# Patient Record
Sex: FEMALE | Race: WHITE | NOT HISPANIC OR LATINO | Employment: FULL TIME | URBAN - METROPOLITAN AREA
[De-identification: names, ages, dates, MRNs, and addresses within clinical notes are randomized per-mention and may not be internally consistent; named-entity substitution may affect disease eponyms.]

---

## 2017-07-24 ENCOUNTER — OFFICE VISIT (OUTPATIENT)
Dept: URGENT CARE | Facility: URGENT CARE | Age: 42
End: 2017-07-24
Payer: COMMERCIAL

## 2017-07-24 VITALS
OXYGEN SATURATION: 95 % | HEART RATE: 103 BPM | SYSTOLIC BLOOD PRESSURE: 108 MMHG | TEMPERATURE: 99.6 F | DIASTOLIC BLOOD PRESSURE: 66 MMHG | WEIGHT: 216.1 LBS

## 2017-07-24 DIAGNOSIS — K60.2 ANAL FISSURE: ICD-10-CM

## 2017-07-24 DIAGNOSIS — K64.8 INTERNAL HEMORRHOID, BLEEDING: Primary | ICD-10-CM

## 2017-07-24 LAB
ERYTHROCYTE [DISTWIDTH] IN BLOOD BY AUTOMATED COUNT: 12.7 % (ref 10–15)
HCT VFR BLD AUTO: 42.3 % (ref 35–47)
HGB BLD-MCNC: 14.1 G/DL (ref 11.7–15.7)
MCH RBC QN AUTO: 32.6 PG (ref 26.5–33)
MCHC RBC AUTO-ENTMCNC: 33.3 G/DL (ref 31.5–36.5)
MCV RBC AUTO: 98 FL (ref 78–100)
PLATELET # BLD AUTO: 236 10E9/L (ref 150–450)
RBC # BLD AUTO: 4.32 10E12/L (ref 3.8–5.2)
WBC # BLD AUTO: 9.9 10E9/L (ref 4–11)

## 2017-07-24 PROCEDURE — 99203 OFFICE O/P NEW LOW 30 MIN: CPT | Performed by: FAMILY MEDICINE

## 2017-07-24 PROCEDURE — 85027 COMPLETE CBC AUTOMATED: CPT | Performed by: FAMILY MEDICINE

## 2017-07-24 PROCEDURE — 36415 COLL VENOUS BLD VENIPUNCTURE: CPT | Performed by: FAMILY MEDICINE

## 2017-07-24 RX ORDER — CETIRIZINE HCL 5 MG
2.5 TABLET ORAL DAILY
COMMUNITY

## 2017-07-24 NOTE — MR AVS SNAPSHOT
"              After Visit Summary   2017    Carri Estrada    MRN: [id number]           Patient Information     Date Of Birth          1975        Visit Information        Provider Department      2017 12:15 PM Nancy Akhtar MD [facility]        Today's Diagnoses     Internal hemorrhoid, bleeding    -  1    Anal fissure           Follow-ups after your visit        Who to contact     If you have questions or need follow up information about today's clinic visit or your schedule please contact [facility] directly at [phone].  Normal or non-critical lab and imaging results will be communicated to you by [employer], letter or phone within 4 business days after the clinic has received the results. If you do not hear from us within 7 days, please contact the clinic through [employer] or phone. If you have a critical or abnormal lab result, we will notify you by phone as soon as possible.  Submit refill requests through [employer] or call your pharmacy and they will forward the refill request to us. Please allow 3 business days for your refill to be completed.          Additional Information About Your Visit        MyChart Information     [employer] lets you send messages to your doctor, view your test results, renew your prescriptions, schedule appointments and more. To sign up, go to [email or web address] . Click on \"Log in\" on the left side of the screen, which will take you to the Welcome page. Then click on \"Sign up Now\" on the right side of the page.     You will be asked to enter the access code listed below, as well as some personal information. Please follow the directions to create your username and password.     Your access code is: [email or web address]  Expires: 10/22/2017  1:41 PM     Your access code will  in 90 days. If you need help or a new code, please call your [place] clinic or [phone].        Care EveryWhere ID     This is your Care EveryWhere ID. " This could be used by other organizations to access your [place] medical records  [id number]        Your Vitals Were     Pulse Temperature Pulse Oximetry             103 99.6  F (37.6  C) (Oral) 95%          Blood Pressure from Last 3 Encounters:   07/24/17 108/66    Weight from Last 3 Encounters:   07/24/17 216 lb 1.6 oz (98 kg)              We Performed the Following     CBC with platelets        Primary Care Provider    None Specified       No primary provider on file.        Equal Access to Services     [facility]: Hadii aad ku hadasho Soomaali, waaxda luqadaha, qaybta kaalmada adeegyada, waxay idiin hayaan adeeg eliobrendonart morrow . So [facility] [phone].    ATENCIÓN: Si habla español, tiene a robin disposición servicios gratuitos de asistencia lingüística. Llame al [phone].    We comply with applicable federal civil rights laws and [place] laws. We do not discriminate on the basis of race, color, national origin, age, disability sex, sexual orientation or gender identity.            Thank you!     Thank you for choosing [facility]  for your care. Our goal is always to provide you with excellent care. Hearing back from our patients is one way we can continue to improve our services. Please take a few minutes to complete the written survey that you may receive in the mail after your visit with us. Thank you!             Your Updated Medication List - Protect others around you: Learn how to safely use, store and throw away your medicines at www.disposemymeds.org.          This list is accurate as of: 7/24/17  1:41 PM.  Always use your most recent med list.                   Brand Name Dispense Instructions for use Diagnosis    cetirizine 2.5 mg half-tab    zyrTEC     Take 2.5 mg by mouth daily        EXCEDRIN PO      Take 600 mg by mouth daily

## 2017-07-24 NOTE — PROGRESS NOTES
SUBJECTIVE:  Chief Complaint   Patient presents with     Rectal Problem     start: 3 days ago sx: rectal bleeding, stomach bubbly, little bloating, no pain tx: none       Carri Estrada is a 42 year old female is coming today because of visible rectal bleeding.  This has been present for 3 days.  One episode 3 days ago and one episode 1 day ago with toilet water with bright red color  The patient reports the following symptoms: loose stools (had 4 diarrhea stools 3 days ago) and visible blood  .  No rectal pain , no abdominal pain, little bloating  The patient denies: constipation and no blood/ mucous mixed in stool.  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    History reviewed. No pertinent past medical history.    ALLERGIES:  Review of patient's allergies indicates not on file.      No current outpatient prescriptions on file prior to visit.  No current facility-administered medications on file prior to visit.     Social History   Substance Use Topics     Smoking status: Current Every Day Smoker     Types: Cigarettes     Smokeless tobacco: Current User     Alcohol use Yes       Family History   Problem Relation Age of Onset     CANCER Mother      Myocardial Infarction Father      Breast Cancer Paternal Grandmother      Family History Negative Brother      Family History Negative Sister          ROS:  INTEGUMENTARY/SKIN: NEGATIVE for worrisome rashes, moles or lesions  EYES: NEGATIVE for vision changes or irritation  ENT/MOUTH: NEGATIVE for ear, mouth and throat problems  RESP:NEGATIVE for significant cough or SOB  GI: NEGATIVE for nausea, abdominal pain, heartburn, or change in bowel habits    OBJECTIVE:   /66 (BP Location: Right arm, Patient Position: Chair, Cuff Size: Adult Large)  Pulse 103  Temp 99.6  F (37.6  C) (Oral)  Wt 216 lb 1.6 oz (98 kg)  SpO2 95%  EXAM:  GENERAL APPEARANCE: alert, mild distress and  cooperative  ABDOMEN: soft, nontender, without hepatosplenomegaly or masses and bowel sounds normal  RECTAL:  internal hemorroid(s) present at 1.5 cm with soft clot visible and little oozing and  Tear/ fissure of anal ring at 6 o'clock  With no bleeding in region of scar from previous repair  MS: extremities normal- no gross deformities noted and gait normal  SKIN: no suspicious lesions or rashes  NEURO: Normal strength and tone, sensory exam grossly normal, mentation intact and speech normal  Results for orders placed or performed in visit on 07/24/17   CBC with platelets   Result Value Ref Range    WBC 9.9 4.0 - 11.0 10e9/L    RBC Count 4.32 3.8 - 5.2 10e12/L    Hemoglobin 14.1 11.7 - 15.7 g/dL    Hematocrit 42.3 35.0 - 47.0 %    MCV 98 78 - 100 fl    MCH 32.6 26.5 - 33.0 pg    MCHC 33.3 31.5 - 36.5 g/dL    RDW 12.7 10.0 - 15.0 %    Platelet Count 236 150 - 450 10e9/L         ASSESSMENT:  Internal hemorrhoid, bleeding     - CBC with platelets    Anal fissure     - CBC with platelets   : Patient should add stool softeners like metamucil and/ or docusate to the daily meds to be able to pass stools without problems of damage to the anus and adjacent tissues.    Discussed using warm water rinse to clean the anus to reduce trauma to the anal area    If severe bleeding return for hemaglobin re-check

## 2017-07-24 NOTE — NURSING NOTE
Chief Complaint   Patient presents with     Rectal Problem     start: 3 days ago sx: rectal bleeding, stomach bubbly, little bloating, no pain tx: none         Initial /66 (BP Location: Right arm, Patient Position: Chair, Cuff Size: Adult Large)  Pulse 103  Temp 99.6  F (37.6  C) (Oral)  Wt 216 lb 1.6 oz (98 kg)  SpO2 95% There is no height or weight on file to calculate BMI.  Medication Reconciliation: complete   Dorcas Holt MA

## 2017-10-10 ENCOUNTER — OFFICE VISIT (OUTPATIENT)
Dept: FAMILY MEDICINE | Facility: CLINIC | Age: 42
End: 2017-10-10
Payer: COMMERCIAL

## 2017-10-10 VITALS
TEMPERATURE: 98.6 F | DIASTOLIC BLOOD PRESSURE: 88 MMHG | BODY MASS INDEX: 37.07 KG/M2 | HEIGHT: 65 IN | WEIGHT: 222.5 LBS | HEART RATE: 91 BPM | SYSTOLIC BLOOD PRESSURE: 126 MMHG | OXYGEN SATURATION: 99 %

## 2017-10-10 DIAGNOSIS — R21 RASH: Primary | ICD-10-CM

## 2017-10-10 PROCEDURE — 99213 OFFICE O/P EST LOW 20 MIN: CPT | Performed by: NURSE PRACTITIONER

## 2017-10-10 RX ORDER — TRIAMCINOLONE ACETONIDE 1 MG/G
OINTMENT TOPICAL
Qty: 30 G | Refills: 0 | Status: SHIPPED | OUTPATIENT
Start: 2017-10-10 | End: 2018-12-13

## 2017-10-10 ASSESSMENT — PAIN SCALES - GENERAL: PAINLEVEL: NO PAIN (0)

## 2017-10-10 NOTE — MR AVS SNAPSHOT
"              After Visit Summary   10/10/2017    Carri Estrada    MRN: [id number]           Patient Information     Date Of Birth          1975        Visit Information        Provider Department      10/10/2017 2:20 PM Kristine Sanchez APRN CNP [facility]        Today's Diagnoses     Rash    -  1      Care Instructions    If no improvement in the next 2 weeks please let me know.            Follow-ups after your visit        Follow-up notes from your care team     Return in about 2 weeks (around 10/24/2017), or if symptoms worsen or fail to improve.      Who to contact     If you have questions or need follow up information about today's clinic visit or your schedule please contact [facility] directly at [phone].  Normal or non-critical lab and imaging results will be communicated to you by [employer], letter or phone within 4 business days after the clinic has received the results. If you do not hear from us within 7 days, please contact the clinic through MyChart or phone. If you have a critical or abnormal lab result, we will notify you by phone as soon as possible.  Submit refill requests through [employer] or call your pharmacy and they will forward the refill request to us. Please allow 3 business days for your refill to be completed.          Additional Information About Your Visit        MyChart Information     [employer] lets you send messages to your doctor, view your test results, renew your prescriptions, schedule appointments and more. To sign up, go to [email or web address] . Click on \"Log in\" on the left side of the screen, which will take you to the Welcome page. Then click on \"Sign up Now\" on the right side of the page.     You will be asked to enter the access code listed below, as well as some personal information. Please follow the directions to create your username and password.     Your access code is: [email or web address]  Expires: 10/22/2017  1:41 PM   " "  Your access code will  in 90 days. If you need help or a new code, please call your [place] clinic or [phone].        Care EveryWhere ID     This is your Care EveryWhere ID. This could be used by other organizations to access your [place] medical records  [id number]        Your Vitals Were     Pulse Temperature Height Last Period Pulse Oximetry BMI (Body Mass Index)    91 98.6  F (37  C) (Oral) 5' 5\" (1.651 m) 2017 99% 37.03 kg/m2       Blood Pressure from Last 3 Encounters:   10/10/17 126/88   17 108/66    Weight from Last 3 Encounters:   10/10/17 222 lb 8 oz (100.9 kg)   17 216 lb 1.6 oz (98 kg)              Today, you had the following     No orders found for display         Today's Medication Changes          These changes are accurate as of: 10/10/17  3:01 PM.  If you have any questions, ask your nurse or doctor.               Start taking these medicines.        Dose/Directions    triamcinolone 0.1 % ointment   Commonly known as:  KENALOG   Used for:  Rash   Started by:  Kristine Sanchez APRN CNP        Apply sparingly to affected area three times daily for 14 days.   Quantity:  30 g   Refills:  0            Where to get your medicines      These medications were sent to [facility] [id number] [place] [address] [address] [facility] AT [facility]   [facility], [facility] [address]     Phone:  [phone]     triamcinolone 0.1 % ointment                Primary Care Provider    None Specified       No primary provider on file.        Equal Access to Services     [facility] : Hadii aad ku hadasho Solacho, waaxda luqadaha, qaybta kaalmada shalini johnson. So [facility] [phone].    ATENCIÓN: Si habla español, tiene a robin disposición servicios gratuitos de asistencia lingüística. Llame al [phone].    We comply with applicable federal civil rights laws and [place] laws. We do not discriminate on the basis of race, color, " national origin, age, disability, sex, sexual orientation, or gender identity.            Thank you!     Thank you for choosing [facility]  for your care. Our goal is always to provide you with excellent care. Hearing back from our patients is one way we can continue to improve our services. Please take a few minutes to complete the written survey that you may receive in the mail after your visit with us. Thank you!             Your Updated Medication List - Protect others around you: Learn how to safely use, store and throw away your medicines at www.disposemymeds.org.          This list is accurate as of: 10/10/17  3:01 PM.  Always use your most recent med list.                   Brand Name Dispense Instructions for use Diagnosis    cetirizine 2.5 mg half-tab    zyrTEC     Take 2.5 mg by mouth daily        EXCEDRIN PO      Take 600 mg by mouth daily        triamcinolone 0.1 % ointment    KENALOG    30 g    Apply sparingly to affected area three times daily for 14 days.    Rash

## 2017-10-10 NOTE — NURSING NOTE
"Chief Complaint   Patient presents with     Derm Problem     Initial /88 (BP Location: Right arm, Patient Position: Chair, Cuff Size: Adult Large)  Pulse 91  Temp 98.6  F (37  C) (Oral)  Ht 5' 5\" (1.651 m)  Wt 222 lb 8 oz (100.9 kg)  LMP 09/24/2017  SpO2 99%  BMI 37.03 kg/m2 Estimated body mass index is 37.03 kg/(m^2) as calculated from the following:    Height as of this encounter: 5' 5\" (1.651 m).    Weight as of this encounter: 222 lb 8 oz (100.9 kg).  BP completed using cuff size large right arm.    Lisa Magill, CMA    "

## 2017-10-10 NOTE — PROGRESS NOTES
HPI    SUBJECTIVE:   Carri Estrada is a 42 year old female who presents to clinic today for the following health issues:      Rash      Duration: JULY    Description  Location: all over randomly, but really behind legs  Itching: severe    Intensity:  severe    Accompanying signs and symptoms: None    History (similar episodes/previous evaluation): None    Precipitating or alleviating factors:  New exposures:  None  Recent travel: no      Therapies tried and outcome: Zyrtec/cetirizine -  effective     has a rash too.  His started after hers did.  He was seen in the clinic and diagnosed with dermatitis.  He has been using a steroid cream without improvement.  His rash is over his lower midsection and she reports his rash looks much different than hers.  Her rash started with small bumps behind both knees.  She has been getting itchy bumps that pop up together in various places over the past couple months.  They are very, very itchy when they develop.  Notes were living in a basement for awhile.  Mattress was quite old.  Recently have moved and purchased a new mattress.      Problem list and histories reviewed & adjusted, as indicated.  Additional history: as documented    Current Outpatient Prescriptions   Medication Sig Dispense Refill     cetirizine (ZYRTEC) 2.5 mg half-tab Take 2.5 mg by mouth daily       Aspirin-Acetaminophen-Caffeine (EXCEDRIN PO) Take 600 mg by mouth daily       Allergies   Allergen Reactions     Foeniculum Vulgare Swelling     No Clinical Screening - See Comments      Other reaction(s): Edema       Reviewed and updated as needed this visit by clinical staffTobacco  Allergies  Meds  Problems  Med Hx  Surg Hx  Fam Hx  Soc Hx        Reviewed and updated as needed this visit by Provider         ROS:  Constitutional, HEENT, cardiovascular, pulmonary, gi and gu systems are negative, except as otherwise noted.      OBJECTIVE:   /88 (BP Location: Right arm, Patient Position:  "Chair, Cuff Size: Adult Large)  Pulse 91  Temp 98.6  F (37  C) (Oral)  Ht 5' 5\" (1.651 m)  Wt 222 lb 8 oz (100.9 kg)  LMP 09/24/2017  SpO2 99%  BMI 37.03 kg/m2  Body mass index is 37.03 kg/(m^2).  GENERAL: healthy, alert and no distress  SKIN: Very few scatterred small, ~1mm pink papules some with excoriated centers on arms, trunk and legs    Diagnostic Test Results:  none     ASSESSMENT/PLAN:   1. Rash  ?etiology, discussed possible bed bugs given intense itching.  No borrows to indicate scabies.  Check mattress for signs of bed bugs; treat as necessary.  May need derm referral if no sign of bed bugs and no improvement in the next 2 weeks.   - triamcinolone (KENALOG) 0.1 % ointment; Apply sparingly to affected area three times daily for 14 days.  Dispense: 30 g; Refill: 0    F/u 2 wks; prn     WARD Combs CNP  [facility]      Physical Exam      "

## 2018-02-28 ENCOUNTER — TELEPHONE (OUTPATIENT)
Dept: FAMILY MEDICINE | Facility: CLINIC | Age: 43
End: 2018-02-28

## 2018-02-28 NOTE — TELEPHONE ENCOUNTER
Panel Management Review      Patient has the following on her problem list: None      Composite cancer screening  Chart review shows that this patient is due/due soon for the following Pap Smear  Summary:    Patient is due/failing the following:   PAP    Action needed:   Patient needs office visit for physical with pap.    Type of outreach:    Phone, left message for patient to call back.     Questions for provider review:    None                                                                                                                                    Samaria Ibrahim MA     Chart routed to Care Team .

## 2018-03-23 ENCOUNTER — RADIANT APPOINTMENT (OUTPATIENT)
Dept: GENERAL RADIOLOGY | Facility: CLINIC | Age: 43
End: 2018-03-23
Attending: NURSE PRACTITIONER
Payer: COMMERCIAL

## 2018-03-23 ENCOUNTER — OFFICE VISIT (OUTPATIENT)
Dept: PEDIATRICS | Facility: CLINIC | Age: 43
End: 2018-03-23
Payer: COMMERCIAL

## 2018-03-23 VITALS
HEIGHT: 65 IN | HEART RATE: 95 BPM | WEIGHT: 225.3 LBS | SYSTOLIC BLOOD PRESSURE: 118 MMHG | OXYGEN SATURATION: 96 % | BODY MASS INDEX: 37.54 KG/M2 | TEMPERATURE: 98.6 F | DIASTOLIC BLOOD PRESSURE: 76 MMHG

## 2018-03-23 DIAGNOSIS — Z71.6 ENCOUNTER FOR SMOKING CESSATION COUNSELING: ICD-10-CM

## 2018-03-23 DIAGNOSIS — Z00.00 HEALTH CARE MAINTENANCE: Primary | ICD-10-CM

## 2018-03-23 DIAGNOSIS — R05.9 COUGH: ICD-10-CM

## 2018-03-23 DIAGNOSIS — Z12.4 SCREENING FOR MALIGNANT NEOPLASM OF CERVIX: ICD-10-CM

## 2018-03-23 PROCEDURE — 87624 HPV HI-RISK TYP POOLED RSLT: CPT | Performed by: NURSE PRACTITIONER

## 2018-03-23 PROCEDURE — 99396 PREV VISIT EST AGE 40-64: CPT | Performed by: NURSE PRACTITIONER

## 2018-03-23 PROCEDURE — 71046 X-RAY EXAM CHEST 2 VIEWS: CPT

## 2018-03-23 PROCEDURE — G0145 SCR C/V CYTO,THINLAYER,RESCR: HCPCS | Performed by: NURSE PRACTITIONER

## 2018-03-23 ASSESSMENT — ENCOUNTER SYMPTOMS
ARTHRALGIAS: 0
DIZZINESS: 1
CONSTIPATION: 0
PARESTHESIAS: 0
SORE THROAT: 0
SHORTNESS OF BREATH: 1
PALPITATIONS: 0
EYE PAIN: 0
WEAKNESS: 1
MYALGIAS: 1
HEADACHES: 1
CHILLS: 0
DIARRHEA: 0
NAUSEA: 1
COUGH: 1
HEMATURIA: 0
HEARTBURN: 1
HEMATOCHEZIA: 0
JOINT SWELLING: 0
DYSURIA: 0
ABDOMINAL PAIN: 0
FREQUENCY: 0

## 2018-03-23 NOTE — PROGRESS NOTES
SUBJECTIVE:   CC: Carri Estrada is an 42 year old woman who presents for preventive health visit.     Physical   Annual:     Getting at least 3 servings of Calcium per day::  Yes    Bi-annual eye exam::  NO    Dental care twice a year::  Yes    Sleep apnea or symptoms of sleep apnea::  Daytime drowsiness    Diet::  Regular (no restrictions)    Frequency of exercise::  None    Taking medications regularly::  Yes    Medication side effects::  None    Additional concerns today::  No                Discuss Tobacco cessation- chantix    Today's PHQ-2 Score:   PHQ-2 ( 1999 Pfizer) 10/10/2017   Q1: Little interest or pleasure in doing things 0   Q2: Feeling down, depressed or hopeless 0   PHQ-2 Score 0       Abuse: Current or Past(Physical, Sexual or Emotional)- No  Do you feel safe in your environment - Yes    Social History   Substance Use Topics     Smoking status: Current Every Day Smoker     Packs/day: 0.50     Types: Cigarettes     Smokeless tobacco: Never Used     Alcohol use Yes      Comment: couple drinks a day in the evening      No flowsheet data found.  AUDIT - Alcohol Use Disorders Identification Test - Reproduced from the World Health Organization Audit 2001 (Second Edition) 3/23/2018   1.  How often do you have a drink containing alcohol? 4 or more times a week   2.  How many drinks containing alcohol do you have on a typical day when you are drinking? 3 or 4   3.  How often do you have five or more drinks on one occasion? Daily or almost daily   4.  How often during the last year have you found that you were not able to stop drinking once you had started? Daily or almost daily   5.  How often during the last year have you failed to do what was normally expected of you because of drinking? Less than monthly   6.  How often during the last year have you needed a first drink in the morning to get yourself going after a heavy drinking session? Never   7.  How often during the last year have you had a  feeling of guilt or remorse after drinking? Less than monthly   8.  How often during the last year have you been unable to remember what happened the night before because of your drinking? Never   9.  Have you or someone else been injured because of your drinking? No   10. Has a relative, friend, doctor or other health care worker been concerned about your drinking or suggested you cut down? No   TOTAL SCORE 15   Drinks almost nightly to cope with stress.       Smoking since 6th grade. Wheezes at night.        Reviewed orders with patient.  Reviewed health maintenance and updated orders accordingly - Yes  Labs reviewed in EPIC    Patient under age 50, mutual decision reflected in health maintenance.      Pertinent mammograms are reviewed under the imaging tab.  History of abnormal Pap smear: 5 years ago - pap results were normal at [facility] by Dr. Angelina Pepper  and is looking to see provider there again and do PAP    Reviewed and updated as needed this visit by clinical staff         Reviewed and updated as needed this visit by Provider        Patient states she listed many + ROS but doesn't think we need to talk about any of them as they are intermittent. Feels week because she is overweight. Gets intermittent nausea, headaches, muscle aches, heartburn, etc. No chest pain, shortness of breath, or progressive sx.     Review of Systems   Constitutional: Negative for chills.   HENT: Positive for congestion. Negative for ear pain, hearing loss and sore throat.    Eyes: Positive for visual disturbance. Negative for pain.   Respiratory: Positive for cough and shortness of breath.    Cardiovascular: Negative for chest pain, palpitations and peripheral edema.   Gastrointestinal: Positive for heartburn and nausea. Negative for abdominal pain, constipation, diarrhea and hematochezia.   Genitourinary: Negative for dysuria, frequency, genital sores, hematuria, pelvic pain, urgency, vaginal  "bleeding and vaginal discharge.   Musculoskeletal: Positive for myalgias. Negative for arthralgias and joint swelling.   Skin: Negative for rash.   Neurological: Positive for dizziness, weakness and headaches. Negative for paresthesias.   Psychiatric/Behavioral: Positive for mood changes.          OBJECTIVE:   /76  Pulse 95  Temp 98.6  F (37  C) (Oral)  Ht 5' 5\" (1.651 m)  Wt 225 lb 4.8 oz (102.2 kg)  SpO2 96%  BMI 37.49 kg/m2  Physical Exam  GENERAL: healthy, alert and no distress  EYES: Eyes grossly normal to inspection, PERRL and conjunctivae and sclerae normal  HENT: ear canals and TM's normal, nose and mouth without ulcers or lesions  NECK: no adenopathy, no asymmetry, masses, or scars and thyroid normal to palpation  RESP: lungs clear to auscultation - no rales, rhonchi or wheezes  BREAST: normal without masses, tenderness or nipple discharge and no palpable axillary masses or adenopathy  CV: regular rate and rhythm, normal S1 S2, no S3 or S4, no murmur, click or rub, no peripheral edema and peripheral pulses strong  ABDOMEN: soft, nontender, no hepatosplenomegaly, no masses and bowel sounds normal  MS: no gross musculoskeletal defects noted, no edema  SKIN: no suspicious lesions or rashes  NEURO: Normal strength and tone, mentation intact and speech normal  PSYCH: mentation appears normal, affect normal/bright    ASSESSMENT/PLAN:   1. Health care maintenance  - TSH with free T4 reflex  - Lipid panel reflex to direct LDL Fasting  - Glucose  - MA Screening Digital Bilateral; Future    2. Screening for malignant neoplasm of cervix  Pap today.     3. Encounter for smoking cessation counseling  Has tolerated chanitx in the past. Discussed r/b/se and reasons to seek care urgently.   - varenicline (CHANTIX STARTING MONTH HUONG) 0.5 MG X 11 & 1 MG X 42 tablet; Take 0.5 mg tab daily for 3 days, then 0.5 mg tab twice daily for 4 days, then 1 mg twice daily.  Dispense: 53 tablet; Refill: 0    4. Cough  Ongoing " "intermittent cough. No fever, weight loss, hemoptysis. Wants CXR to rule out lung CA.  -If normal, will monitor sx and RTC for worsening sx.  -Start Chantix.   -Declines PFTs  - XR Chest 2 Views; Future    COUNSELING:  Reviewed preventive health counseling, as reflected in patient instructions         reports that she has been smoking Cigarettes.  She has been smoking about 0.50 packs per day. She has never used smokeless tobacco.  Tobacco Cessation Action Plan: SEe above  Estimated body mass index is 37.03 kg/(m^2) as calculated from the following:    Height as of 10/10/17: 5' 5\" (1.651 m).    Weight as of 10/10/17: 222 lb 8 oz (100.9 kg).   Weight management plan: Discussed healthy diet and exercise guidelines and patient will follow up in 12 months in clinic to re-evaluate.    Counseling Resources:  ATP IV Guidelines  Pooled Cohorts Equation Calculator  Breast Cancer Risk Calculator  FRAX Risk Assessment  ICSI Preventive Guidelines  Dietary Guidelines for Americans, 2010  USDA's MyPlate  ASA Prophylaxis  Lung CA Screening    WARD Pardo [facility] JACINTA  Answers for HPI/ROS submitted by the patient on 3/23/2018   PHQ-2 Score: 2    "

## 2018-03-23 NOTE — MR AVS SNAPSHOT
After Visit Summary   3/23/2018    Carri Estrada    MRN: [id number]           Patient Information     Date Of Birth          1975        Visit Information        Provider Department      3/23/2018 10:20 AM Selena Muller APRN [facility]        Today's Diagnoses     Health care maintenance    -  1    Screening for malignant neoplasm of cervix        Encounter for smoking cessation counseling        Cough          Care Instructions    -We did pap today!  -Get labs  -I recommend new weight watchers program  -Limit alcohol to no more than 7 per week  -Chantix  -I'll get back to you about chest xray results.         Preventive Health Recommendations  Female Ages 40 to 49    Yearly exam:     See your health care provider every year in order to  1. Review health changes.   2. Discuss preventive care.    3. Review your medicines if your doctor prescribed any.      Get a Pap test every three years (unless you have an abnormal result and your provider advises testing more often).      If you get Pap tests with HPV test, you only need to test every 5 years, unless you have an abnormal result. You do not need a Pap test if your uterus was removed (hysterectomy) and you have not had cancer.      You should be tested each year for STDs (sexually transmitted diseases), if you're at risk.       Ask your doctor if you should have a mammogram.      Have a colonoscopy (test for colon cancer) if someone in your family has had colon cancer or polyps before age 50.       Have a cholesterol test every 5 years.       Have a diabetes test (fasting glucose) after age 45. If you are at risk for diabetes, you should have this test every 3 years.    Shots: Get a flu shot each year. Get a tetanus shot every 10 years.     Nutrition:     Eat at least 5 servings of fruits and vegetables each day.    Eat whole-grain bread, whole-wheat pasta and brown rice instead of white grains and rice.    Talk to  "your provider about Calcium and Vitamin D.     Lifestyle    Exercise at least 150 minutes a week (an average of 30 minutes a day, 5 days a week). This will help you control your weight and prevent disease.    Limit alcohol to one drink per day.    No smoking.     Wear sunscreen to prevent skin cancer.    See your dentist every six months for an exam and cleaning.          Follow-ups after your visit        Future tests that were ordered for you today     Open Future Orders        Priority Expected Expires Ordered    XR Chest 2 Views Routine 3/23/2018 3/23/2019 3/23/2018    XR Chest 2 Views Routine 3/23/2018 3/23/2019 3/23/2018    MA Screening Digital Bilateral Routine  3/23/2019 3/23/2018            Who to contact     If you have questions or need follow up information about today's clinic visit or your schedule please contact [facility] directly at [phone].  Normal or non-critical lab and imaging results will be communicated to you by MyChart, letter or phone within 4 business days after the clinic has received the results. If you do not hear from us within 7 days, please contact the clinic through [employer] or phone. If you have a critical or abnormal lab result, we will notify you by phone as soon as possible.  Submit refill requests through [employer] or call your pharmacy and they will forward the refill request to us. Please allow 3 business days for your refill to be completed.          Additional Information About Your Visit        [employer] Information     [employer] lets you send messages to your doctor, view your test results, renew your prescriptions, schedule appointments and more. To sign up, go to [email or web address] . Click on \"Log in\" on the left side of the screen, which will take you to the Welcome page. Then click on \"Sign up Now\" on the right side of the page.     You will be asked to enter the access code listed below, as well as some personal information. Please follow the " "directions to create your username and password.     Your access code is: [email or web address]  Expires: 2018 11:20 AM     Your access code will  in 90 days. If you need help or a new code, please call your [place] clinic or [phone].        Care EveryWhere ID     This is your Care EveryWhere ID. This could be used by other organizations to access your [place] medical records  [id number]        Your Vitals Were     Pulse Temperature Height Pulse Oximetry BMI (Body Mass Index)       95 98.6  F (37  C) (Oral) 5' 5\" (1.651 m) 96% 37.49 kg/m2        Blood Pressure from Last 3 Encounters:   18 118/76   10/10/17 126/88   17 108/66    Weight from Last 3 Encounters:   18 225 lb 4.8 oz (102.2 kg)   10/10/17 222 lb 8 oz (100.9 kg)   17 216 lb 1.6 oz (98 kg)              We Performed the Following     Glucose     Lipid panel reflex to direct LDL Fasting     TSH with free T4 reflex          Today's Medication Changes          These changes are accurate as of 3/23/18 11:20 AM.  If you have any questions, ask your nurse or doctor.               Start taking these medicines.        Dose/Directions    varenicline 0.5 MG X 11 & 1 MG X 42 tablet   Commonly known as:  CHANTIX STARTING MONTH HUONG   Used for:  Encounter for smoking cessation counseling   Started by:  Selena Muller APRN CNP        Take 0.5 mg tab daily for 3 days, then 0.5 mg tab twice daily for 4 days, then 1 mg twice daily.   Quantity:  53 tablet   Refills:  0            Where to get your medicines      These medications were sent to [id number] - [place], [place] - [address]  [address], [facility] [address]     Phone:  [phone]     varenicline 0.5 MG X 11 & 1 MG X 42 tablet                Primary Care Provider Fax #    Physician No Ref-Primary [phone]       No address on file        Equal Access to Services     VERITO BURGESS AH: merrick Vaughn, " martha hendricksvictor hugosamuel rodriguezlawrence leonin hayaan adeeg kharash la'aan ah. So [facility] [phone].    ATENCIÓN: Si blanka pond, tiene a robin disposición servicios gratuitos de asistencia lingüística. Daxa al [phone].    We comply with applicable federal civil rights laws and [place] laws. We do not discriminate on the basis of race, color, national origin, age, disability, sex, sexual orientation, or gender identity.            Thank you!     Thank you for choosing [facility]  for your care. Our goal is always to provide you with excellent care. Hearing back from our patients is one way we can continue to improve our services. Please take a few minutes to complete the written survey that you may receive in the mail after your visit with us. Thank you!             Your Updated Medication List - Protect others around you: Learn how to safely use, store and throw away your medicines at www.disposemymeds.org.          This list is accurate as of 3/23/18 11:20 AM.  Always use your most recent med list.                   Brand Name Dispense Instructions for use Diagnosis    cetirizine 2.5 mg half-tab    zyrTEC     Take 2.5 mg by mouth daily        EXCEDRIN PO      Take 600 mg by mouth daily        triamcinolone 0.1 % ointment    KENALOG    30 g    Apply sparingly to affected area three times daily for 14 days.    Rash       varenicline 0.5 MG X 11 & 1 MG X 42 tablet    CHANTIX STARTING MONTH HUONG    53 tablet    Take 0.5 mg tab daily for 3 days, then 0.5 mg tab twice daily for 4 days, then 1 mg twice daily.    Encounter for smoking cessation counseling

## 2018-03-23 NOTE — LETTER
April 4, 2018    Carri Estrada  [address]    [place] [address]    Dear Carri,  We are happy to inform you that your PAP smear result from 03/23/18 is normal.  We are now able to do a follow up test on PAP smears. The DNA test is for HPV (Human Papilloma Virus). Cervical cancer is closely linked with certain types of HPV. Your result showed no evidence of high risk HPV.  Therefore we recommend you return in 3 years for your next pap smear.  You will still need to return to the clinic every year for an annual exam and other preventive tests.  Please contact the clinic at [phone] with any questions.  Sincerely,    WARD Pardo CNP/es

## 2018-03-27 LAB
COPATH REPORT: NORMAL
PAP: NORMAL

## 2018-03-28 DIAGNOSIS — Z00.00 HEALTH CARE MAINTENANCE: ICD-10-CM

## 2018-03-28 LAB
CHOLEST SERPL-MCNC: 190 MG/DL
GLUCOSE SERPL-MCNC: 93 MG/DL (ref 70–99)
HDLC SERPL-MCNC: 48 MG/DL
LDLC SERPL CALC-MCNC: 118 MG/DL
NONHDLC SERPL-MCNC: 142 MG/DL
TRIGL SERPL-MCNC: 119 MG/DL
TSH SERPL DL<=0.005 MIU/L-ACNC: 1.67 MU/L (ref 0.4–4)

## 2018-03-28 PROCEDURE — 80061 LIPID PANEL: CPT | Performed by: NURSE PRACTITIONER

## 2018-03-28 PROCEDURE — 82947 ASSAY GLUCOSE BLOOD QUANT: CPT | Performed by: NURSE PRACTITIONER

## 2018-03-28 PROCEDURE — 84443 ASSAY THYROID STIM HORMONE: CPT | Performed by: NURSE PRACTITIONER

## 2018-03-28 PROCEDURE — 36415 COLL VENOUS BLD VENIPUNCTURE: CPT | Performed by: NURSE PRACTITIONER

## 2018-03-29 LAB
FINAL DIAGNOSIS: NORMAL
HPV HR 12 DNA CVX QL NAA+PROBE: NEGATIVE
HPV16 DNA SPEC QL NAA+PROBE: NEGATIVE
HPV18 DNA SPEC QL NAA+PROBE: NEGATIVE
SPECIMEN DESCRIPTION: NORMAL
SPECIMEN SOURCE CVX/VAG CYTO: NORMAL

## 2018-04-10 ENCOUNTER — RADIANT APPOINTMENT (OUTPATIENT)
Dept: MAMMOGRAPHY | Facility: CLINIC | Age: 43
End: 2018-04-10
Attending: NURSE PRACTITIONER
Payer: COMMERCIAL

## 2018-04-10 DIAGNOSIS — Z00.00 HEALTH CARE MAINTENANCE: ICD-10-CM

## 2018-04-10 DIAGNOSIS — Z12.31 VISIT FOR SCREENING MAMMOGRAM: ICD-10-CM

## 2018-04-10 PROCEDURE — 77067 SCR MAMMO BI INCL CAD: CPT | Mod: TC

## 2018-04-10 PROCEDURE — 77063 BREAST TOMOSYNTHESIS BI: CPT | Mod: TC

## 2018-04-16 ENCOUNTER — HOSPITAL ENCOUNTER (OUTPATIENT)
Dept: ULTRASOUND IMAGING | Facility: CLINIC | Age: 43
End: 2018-04-16
Attending: NURSE PRACTITIONER
Payer: COMMERCIAL

## 2018-04-16 DIAGNOSIS — R92.8 ABNORMAL MAMMOGRAM: ICD-10-CM

## 2018-04-16 PROCEDURE — 76642 ULTRASOUND BREAST LIMITED: CPT | Mod: LT

## 2018-04-30 ENCOUNTER — HOSPITAL ENCOUNTER (OUTPATIENT)
Dept: MRI IMAGING | Facility: CLINIC | Age: 43
Discharge: HOME | End: 2018-04-30
Attending: NURSE PRACTITIONER | Admitting: NURSE PRACTITIONER
Payer: COMMERCIAL

## 2018-04-30 DIAGNOSIS — R92.8 ABNORMAL MAMMOGRAM: ICD-10-CM

## 2018-04-30 PROCEDURE — 25000128 H RX IP 250 OP 636: Performed by: NURSE PRACTITIONER

## 2018-04-30 PROCEDURE — 77059 MR BREAST BILATERAL W/O & W CONTRAST: CPT

## 2018-04-30 PROCEDURE — A9585 GADOBUTROL INJECTION: HCPCS | Performed by: NURSE PRACTITIONER

## 2018-04-30 RX ORDER — GADOBUTROL 604.72 MG/ML
10 INJECTION INTRAVENOUS ONCE
Status: COMPLETED | OUTPATIENT
Start: 2018-04-30 | End: 2018-04-30

## 2018-04-30 RX ADMIN — GADOBUTROL 10 ML: 604.72 INJECTION INTRAVENOUS at 08:53

## 2018-05-02 ENCOUNTER — TELEPHONE (OUTPATIENT)
Dept: MAMMOGRAPHY | Facility: CLINIC | Age: 43
End: 2018-05-02

## 2018-05-02 NOTE — TELEPHONE ENCOUNTER
"Patient notified of abnormal breast MRI results.  Explain that an area in each breast \"lights up\" and that bilateral ultrasounds are recommended to further evaluate.  Instructed patient to schedule these exams.  She will receive call from  once orders are placed. Selena Muller notified and will place orders. Patient voices frustration with multiple tests.  Offer reassurance and re-enforce how thorough the radiologist exams are and that ultrasound is very specific way of looking at breast tissue.  Encouraged her to call as more questions arise, provided RN contact information.     "

## 2018-05-04 ENCOUNTER — HOSPITAL ENCOUNTER (OUTPATIENT)
Dept: ULTRASOUND IMAGING | Facility: CLINIC | Age: 43
Discharge: HOME | End: 2018-05-04
Attending: NURSE PRACTITIONER | Admitting: NURSE PRACTITIONER
Payer: COMMERCIAL

## 2018-05-04 DIAGNOSIS — R93.89 ABNORMAL MRI: ICD-10-CM

## 2018-05-04 PROCEDURE — 76642 ULTRASOUND BREAST LIMITED: CPT | Mod: 50

## 2018-05-10 DIAGNOSIS — Z71.6 ENCOUNTER FOR SMOKING CESSATION COUNSELING: Primary | ICD-10-CM

## 2018-05-10 RX ORDER — VARENICLINE TARTRATE 1 MG/1
1 TABLET, FILM COATED ORAL 2 TIMES DAILY
Qty: 56 TABLET | Refills: 2 | Status: SHIPPED | OUTPATIENT
Start: 2018-05-10 | End: 2018-10-09

## 2018-05-10 NOTE — TELEPHONE ENCOUNTER
Pharmacy calling to request a rx for Chantix continuing pack. She finished the starter pack that was rx'd on 3/23/18. Looking for refill today.     Routing to Selena to advise on Chantix continuing pack rx.

## 2018-09-17 ENCOUNTER — TELEPHONE (OUTPATIENT)
Dept: PEDIATRICS | Facility: CLINIC | Age: 43
End: 2018-09-17

## 2018-09-17 NOTE — TELEPHONE ENCOUNTER
Menstrual cycles are changing as she gets older.  Menstrual cramps are getting worse.  Ibuprofen is not working for treatment of cramps  Menses lasts 3 days with heavy bleeding on the second day.  Would like appointment to discuss cramping with periods.  Appointment scheduled with Selena Muller to discuss symptoms.  OSVALDO Daly RN

## 2018-10-09 ENCOUNTER — OFFICE VISIT (OUTPATIENT)
Dept: PEDIATRICS | Facility: CLINIC | Age: 43
End: 2018-10-09
Payer: COMMERCIAL

## 2018-10-09 VITALS
HEIGHT: 65 IN | TEMPERATURE: 97.6 F | DIASTOLIC BLOOD PRESSURE: 78 MMHG | SYSTOLIC BLOOD PRESSURE: 122 MMHG | WEIGHT: 232.1 LBS | BODY MASS INDEX: 38.67 KG/M2

## 2018-10-09 DIAGNOSIS — Z71.6 ENCOUNTER FOR SMOKING CESSATION COUNSELING: ICD-10-CM

## 2018-10-09 DIAGNOSIS — F33.8 SEASONAL AFFECTIVE DISORDER (H): ICD-10-CM

## 2018-10-09 DIAGNOSIS — N94.6 DYSMENORRHEA: Primary | ICD-10-CM

## 2018-10-09 PROCEDURE — 99214 OFFICE O/P EST MOD 30 MIN: CPT | Performed by: NURSE PRACTITIONER

## 2018-10-09 RX ORDER — TRIAMCINOLONE ACETONIDE 1 MG/G
OINTMENT TOPICAL
Qty: 30 G | Refills: 0 | Status: CANCELLED | OUTPATIENT
Start: 2018-10-09

## 2018-10-09 RX ORDER — BUPROPION HYDROCHLORIDE 150 MG/1
150 TABLET ORAL EVERY MORNING
Qty: 90 TABLET | Refills: 1 | Status: SHIPPED | OUTPATIENT
Start: 2018-10-09 | End: 2019-07-11

## 2018-10-09 RX ORDER — VARENICLINE TARTRATE 1 MG/1
1 TABLET, FILM COATED ORAL 2 TIMES DAILY
Qty: 56 TABLET | Refills: 3 | Status: SHIPPED | OUTPATIENT
Start: 2018-10-09 | End: 2018-12-13

## 2018-10-09 NOTE — MR AVS SNAPSHOT
After Visit Summary   10/9/2018    Carri Estrada    MRN: [id number]           Patient Information     Date Of Birth          1975        Visit Information        Provider Department      10/9/2018 7:40 AM Selena Muller APRN [facility]        Today's Diagnoses     Dysmenorrhea    -  1    Encounter for smoking cessation counseling        BMI 38.0-38.9,adult        Seasonal affective disorder (H)          Care Instructions    -Consider light box for winter  -Please increase Chantix  -Start Wellbutrin if needed  -Please schedule pelvic ultrasound [facility] Scheduling [phone]   and schedule with OB (Angela Gonzalez). [phone]          Follow-ups after your visit        Additional Services     BARIATRIC ADULT REFERRAL       Your provider has referred you to: [facility]: [facility] [phone]. [email or web address]    Please be aware that coverage of these services is subject to the terms and limitations of your health insurance plan.  Call member services at your health plan with any benefit or coverage questions.      Please bring the following with you to your appointment:      (1) List of current medications   (2) This referral request   (3) Any documents/labs given to you for this referral            OB/GYN REFERRAL       Your provider [place] you [place]  [facility] [place] [facility] [place] [phone]   [email or web address]    Please be aware that coverage of these services is subject to the terms and limitations of your health insurance plan.  Call member services at your health plan with any benefit or coverage questions.      Please bring the following with you to your appointment:    (1) Any X-Rays, CTs or MRIs which have been performed.  Contact the facility where they were done to arrange for  prior to your scheduled  "appointment.   (2) List of current medications   (3) This referral request   (4) Any documents/labs given to you for this referral                  Future tests that were ordered for you today     Open Future Orders        Priority Expected Expires Ordered    US Pelvic Complete w Transvaginal Routine  10/9/2019 10/9/2018            Who to contact     If you have questions or need follow up information about today's clinic visit or your schedule please contact [facility] directly at [phone].  Normal or non-critical lab and imaging results will be communicated to you by "[employer]hart, letter or phone within 4 business days after the clinic has received the results. If you do not hear from us within 7 days, please contact the clinic through "[employer]hart or phone. If you have a critical or abnormal lab result, we will notify you by phone as soon as possible.  Submit refill requests through [employer] or call your pharmacy and they will forward the refill request to us. Please allow 3 business days for your refill to be completed.          Additional Information About Your Visit        "[employer] Information     [employer] lets you send messages to your doctor, view your test results, renew your prescriptions, schedule appointments and more. To sign up, go to [email or web address] . Click on \"Log in\" on the left side of the screen, which will take you to the Welcome page. Then click on \"Sign up Now\" on the right side of the page.     You will be asked to enter the access code listed below, as well as some personal information. Please follow the directions to create your username and password.     Your access code is: [email or web address]  Expires: 2019  8:25 AM     Your access code will  in 90 days. If you need help or a new code, please call your [place] clinic or [phone].        Care EveryWhere ID     This is your Care EveryWhere ID. This could be used by other organizations to access your [place] medical " "records  [id number]        Your Vitals Were     Temperature Height Last Period BMI (Body Mass Index)          97.6  F (36.4  C) (Tympanic) 5' 5\" (1.651 m) 09/25/2018 38.62 kg/m2         Blood Pressure from Last 3 Encounters:   10/09/18 122/78   03/23/18 118/76   10/10/17 126/88    Weight from Last 3 Encounters:   10/09/18 232 lb 1.6 oz (105.3 kg)   03/23/18 225 lb 4.8 oz (102.2 kg)   10/10/17 222 lb 8 oz (100.9 kg)              We Performed the Following     BARIATRIC ADULT REFERRAL     OB/GYN REFERRAL          Today's Medication Changes          These changes are accurate as of 10/9/18  8:25 AM.  If you have any questions, ask your nurse or doctor.               Start taking these medicines.        Dose/Directions    buPROPion 150 MG 24 hr tablet   Commonly known as:  WELLBUTRIN XL   Used for:  Seasonal affective disorder (H)   Started by:  Selena Muller APRN CNP        Dose:  150 mg   Take 1 tablet (150 mg) by mouth every morning   Quantity:  90 tablet   Refills:  1            Where to get your medicines      These medications were sent to [id number] - [place], [place] - [address]  [address] [address]     Phone:  [phone]     buPROPion 150 MG 24 hr tablet    varenicline 1 MG tablet                Primary Care Provider Office Phone # Fax #    WARD Pardo [phone] [address] [place] [address]        Equal Access to Services     [facility] AH: Hadii francine zengo Solacho, waaxda luqadaha, qaybta kaalmada adeabraham, waxay idiin hayaan adeeg kharash la'aan . So [facility] [phone].    ATENCIÓN: Si habla español, tiene a robin disposición servicios gratuitos de asistencia lingüística. Llame al [phone].    We comply with applicable federal civil rights laws and [place] laws. We do not discriminate on the basis of race, color, national origin, age, disability, sex, sexual orientation, or " gender identity.            Thank you!     Thank you for choosing [facility]  for your care. Our goal is always to provide you with excellent care. Hearing back from our patients is one way we can continue to improve our services. Please take a few minutes to complete the written survey that you may receive in the mail after your visit with us. Thank you!             Your Updated Medication List - Protect others around you: Learn how to safely use, store and throw away your medicines at www.disposemymeds.org.          This list is accurate as of 10/9/18  8:25 AM.  Always use your most recent med list.                   Brand Name Dispense Instructions for use Diagnosis    buPROPion 150 MG 24 hr tablet    WELLBUTRIN XL    90 tablet    Take 1 tablet (150 mg) by mouth every morning    Seasonal affective disorder (H)       cetirizine 2.5 mg half-tab    zyrTEC     Take 2.5 mg by mouth daily        EXCEDRIN PO      Take 600 mg by mouth daily        triamcinolone 0.1 % ointment    KENALOG    30 g    Apply sparingly to affected area three times daily for 14 days.    Rash       * varenicline 0.5 MG X 11 & 1 MG X 42 tablet    CHANTIX STARTING MONTH HUONG    53 tablet    Take 0.5 mg tab daily for 3 days, then 0.5 mg tab twice daily for 4 days, then 1 mg twice daily.    Encounter for smoking cessation counseling       * varenicline 1 MG tablet    CHANTIX    56 tablet    Take 1 tablet (1 mg) by mouth 2 times daily    Encounter for smoking cessation counseling       * Notice:  This list has 2 medication(s) that are the same as other medications prescribed for you. Read the directions carefully, and ask your doctor or other care provider to review them with you.

## 2018-10-09 NOTE — PROGRESS NOTES
"  SUBJECTIVE:   Carri Estrada is a 43 year old female who presents to clinic today for the following health issues:    Patient declines flu vaccine today.    Vaginal Symptoms  Patient having \"excessive\" menstrual cramping      Duration: years - worsening in the last year    Description  \"excessive \" cramping with periods, states menses are 3-4 days but horrible cramping and nausea, back pain    Intensity:  severe    Accompanying signs and symptoms (fever/dysuria/abdominal or back pain): None    History  Sexually active: yes, single partner, contraception - none  Possibility of pregnancy: Don't Know  Recent antibiotic use: no     Precipitating or alleviating factors: None    Therapies tried and outcome: ibuprofen   Outcome: when wears off has \"horrible\" cramping again      ROS: const/gi/gu/gyn otherwise negative     OBJECTIVE:  /78 (BP Location: Right arm, Cuff Size: Adult Large)  Temp 97.6  F (36.4  C) (Tympanic)  Ht 5' 5\" (1.651 m)  Wt 232 lb 1.6 oz (105.3 kg)  LMP 09/25/2018  BMI 38.62 kg/m2  CONSTITUTIONAL: Alert, well-nourished, well-groomed, NAD  RESP: Lungs CTA. No wheeze, rhonchi, rales.  CV: HRRR S1 S2 No MRG. No peripheral edema  GI: Abdomen flat. BS x 4. No TTP. No HSM or masses. No CVAT  PSYCH: Bright affect. Appropriate mentation and speech.       ASSESSMENT/PLAN:  (N94.6) Dysmenorrhea  (primary encounter diagnosis)  Comment: Patient with cycles 24-50 days in length, lasting only 3 days at a time. Gets severe cramps the first few days of her period that are completely debilitating. Menses are NOT particularly heavy. She does get diarrhea and low back pain with her menses but no urinary sx. I suspect endometriosis vs fibroid. Cannot be on OCP given that she is over 35 and smokes.   Plan: US Pelvic Complete w Transvaginal          -Shedule pelvic US  -NSAIDs scheduled at onset of menses (makes her cold apparently but she's willing to take). Discussed appropriate dosing.  -Schedule with OB. "     (Z71.6) Encounter for smoking cessation counseling  Comment: Currently on Chantix. Has never stopped since last time I prescribed it because she was afraid if she stopped and re-started she may have adverse reactions. However, only taking 1 pill per day and is smoking less than previously.   Plan: varenicline (CHANTIX) 1 MG tablet          -Increase to 1 tab BID and set quit date for 2 weeks  -Need to come off the medicine after 6 months or less    (Z68.38) BMI 38.0-38.9,adult  Comment: Currently vegan. Is eating out less and not drinking ETOH but hasn't lost weight.  Plan:   -Recommended reducing carbs, increasing protein  -Referred to bariatric clinic    (F33.8) Seasonal affective disorder (H)  Comment: Needs Wellbutrin yearly. Feeling ok but wants Wellbutrin for when daylight savings time occurs, which is typically when she gets low energy. No history of Si.  Plan:    -Wellbutrin ordered. She is to message me if she picks it up.         Selena Muller, FNP-DNP.

## 2018-10-09 NOTE — PATIENT INSTRUCTIONS
-Consider light box for winter  -Please increase Chantix  -Start Wellbutrin if needed  -Please schedule pelvic ultrasound [facility] Scheduling [phone]   and schedule with OB (Angela Gonzalez). [phone]

## 2018-10-10 ENCOUNTER — HOSPITAL ENCOUNTER (OUTPATIENT)
Dept: ULTRASOUND IMAGING | Facility: CLINIC | Age: 43
Discharge: HOME | End: 2018-10-10
Attending: NURSE PRACTITIONER | Admitting: NURSE PRACTITIONER
Payer: COMMERCIAL

## 2018-10-10 DIAGNOSIS — N94.6 DYSMENORRHEA: ICD-10-CM

## 2018-10-10 PROCEDURE — 76830 TRANSVAGINAL US NON-OB: CPT

## 2018-10-12 ENCOUNTER — OFFICE VISIT (OUTPATIENT)
Dept: SURGERY | Facility: CLINIC | Age: 43
End: 2018-10-12
Attending: NURSE PRACTITIONER
Payer: COMMERCIAL

## 2018-10-12 ENCOUNTER — OFFICE VISIT (OUTPATIENT)
Dept: ENDOCRINOLOGY | Facility: CLINIC | Age: 43
End: 2018-10-12
Attending: NURSE PRACTITIONER
Payer: COMMERCIAL

## 2018-10-12 VITALS
HEIGHT: 65 IN | OXYGEN SATURATION: 97 % | HEART RATE: 87 BPM | WEIGHT: 232.7 LBS | BODY MASS INDEX: 38.77 KG/M2 | DIASTOLIC BLOOD PRESSURE: 82 MMHG | SYSTOLIC BLOOD PRESSURE: 131 MMHG

## 2018-10-12 DIAGNOSIS — R63.2 BINGE EATING: ICD-10-CM

## 2018-10-12 DIAGNOSIS — E66.01 MORBID OBESITY (H): Primary | ICD-10-CM

## 2018-10-12 RX ORDER — TOPIRAMATE 25 MG/1
TABLET, FILM COATED ORAL
Qty: 90 TABLET | Refills: 11 | Status: SHIPPED | OUTPATIENT
Start: 2018-10-12 | End: 2018-12-13

## 2018-10-12 NOTE — PATIENT INSTRUCTIONS
Nutrition Goals  1) Follow 1500 calorie/day diet  2) Limit alcohol to three days per week  3) Walking two times per week    Follow up with RD in one month    Gillian Padilla, SHERRON, LD  If you need to schedule or reschedule with a dietitian please call [phone].
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
## 2018-10-12 NOTE — MR AVS SNAPSHOT
MRN:[id number]                      After Visit Summary   10/12/2018    Carri Estrada    MRN: [id number]           Visit Information        Provider Department      10/12/2018 9:00 AM Gillian Padilla RD [facility]        Your next 10 appointments already scheduled     2018 11:00 AM CST   [facility] Cristal Gonzalez [facility]   [facility]    [address] [place] [address]  [address]  [facility] [address] [phone]           Bring a current list of meds and any records pertaining to this visit. For Physicals, please bring immunization records and any forms needing to be filled out. Please arrive 10 minutes early to complete paperwork.              Care Instructions    Nutrition Goals  1) Follow 1500 calorie/day diet  2) Limit alcohol to three days per week  3) Walking two times per week    Follow up with SHERRON in one month    Gillian Padilla RD, NGOC  If you need to schedule or reschedule with a dietitian please call [phone].          [employer] Information     [employer] is an electronic gateway that provides easy, online access to your medical records. With [employer], you can request a clinic appointment, read your test results, renew a prescription or communicate with your care team.     To sign up for [employer] visit the website at [email or web address]   You will be asked to enter the access code listed below, as well as some personal information. Please follow the directions to create your username and password.     Your access code is: [email or web address]  Expires: 2019  8:25 AM     Your access code will  in 90 days. If you need help or a new code, please contact your [facility] or call [phone] for assistance.        Care EveryWhere ID     This is your Care EveryWhere ID. This could be used by other organizations to access your [place] medical records  [id number]        Equal  Access to Services     [facility] CenterJAZZMINE : Rick Higuera, wanely mensah, qashalini burnett. So [facility] [place] [phone].    ATENCIÓN: Si habla español, tiene a robin disposición servicios gratuitos de asistencia lingüística. Llame al [phone].    We comply with applicable federal civil rights laws and [place] laws. We do not discriminate on the basis of race, color, national origin, age, disability, sex, sexual orientation, or gender identity.
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
## 2018-10-12 NOTE — NURSING NOTE
"  Chief Complaint   Patient presents with     Weight Problem     NMWM     Vitals:    10/12/18 0814   BP: 131/82   Pulse: 87   SpO2: 97%   Weight: 232 lb 11.2 oz   Height: 5' 5\"     Body mass index is 38.72 kg/(m^2).  Rochelle Monae CMA    "

## 2018-10-12 NOTE — PROGRESS NOTES
"Carri Estrada is a 43 year old female presents today for new weight management nutrition consultation.  Patient was referred by Dr Ramirez(10/12/18).    Estimated body mass index is 38.72 kg/(m^2) as calculated from the following:    Height as of an earlier encounter on 10/12/18: 1.651 m (5' 5\").    Weight as of an earlier encounter on 10/12/18: 105.6 kg (232 lb 11.2 oz).     Nutrition history  See MD note for details.  Vegetarian going towards vegan  Allergy to tree nuts per patient    Breakfast: banana and coffee(black)  Lunch: leftovers  Dinner: noodles, vegetables and tofu, rice, frozen pizza, roasted vegetables, burritos ( cooks)  Snacks: PB and celery, occasionally chips  Beverages: water, alcohol 2-4 beers 4 times per week  Out to eat: 1-2 times per week    Nutrition Prescription  1300 calories/day (per MD)  [id number]  Starting Topiramate with MD 10/12/18    Nutrition Diagnosis  Food and nutrition related knowledge deficit r/t lack of prior exposure to calorie counting and nutrition education aeb pt unable to verbalize understanding of 8695-7015 calorie/day diet for weight loss.    Nutrition Intervention  Materials/education provided on 8988-1067 calorie/day diet with portion control and healthy food choices (What to Eat handout), meal and snack planning and websites, sample meal plans.  Patient reported feeling overwhelmed with trying to count calories, she has counted in the past but was eating mostly prepackaged foods at that point in time.  Discussed portion sizes and meals and 1500 calorie menu, discussed starting at 1500 calories and decreasing to 1300 once able to consistently plan 1500 calorie days.  Patient uses a pea protein powder(plans to go completely vegan) discussed using shakes as meal replacements.    Patient Understanding: good  Expected Compliance: good  Follow-Up Plans: food tracking     Nutrition Goals  1) Follow 1500 calorie/day diet  2) Limit alcohol to three days per week  3) " Walking two times per week    Follow-Up:  PRN    Time spent with patient: 45 minutes.  Gillian Padilla RD, LD

## 2018-10-12 NOTE — MR AVS SNAPSHOT
After Visit Summary   10/12/2018    Carri Estrada    MRN: [id number]           Patient Information     Date Of Birth          1975        Visit Information        Provider Department      10/12/2018 8:00 AM Ron Ramirez MD [facility]        Today's Diagnoses     Morbid obesity (H)    -  1    Binge eating          Care Instructions    Welcome to our weight management program!   We are excited to join you on your weight loss journey    Thank you for allowing us the privilege of caring for you. We hope we provided you with the excellent service you deserve.    Please let us know if there is anything else we can do so that we can be sure you are leaving completely satisfied with your care experience.    You saw Dr. Ramirez today.    Instructions per today's visit:   Start Topiramate  Dietitian visit today    Follow up appointments:  [place] pharmacy visit in 4 weeks  Follow up with Dr. Ramirez in 2 months.    To reach MISAEL Castelan for any questions/concerns call [phone]    To schedule appointments with our team, please call [phone]     Please call during clinic hours Monday through Friday 8:00a - 4:00p if you have questions or you can contact us via Narvar at anytime. ?    Fax: [phone]    Thank you,  Medical Weight Management Team    1.  Food Goal:  Eat at a table (instead of in front of media) 3 times a week at home. Decrease alcohol to 4 days a week.  Will meet with the nutritionist today. With a calorie goal of around 1300 a day, should lose 1-2 pounds a week without any other interventions.     2.  Activity Goal:  Will walk twice a week for 30 minutes      3.  Medications:  Will start topiramate.  Take 1 tab daily for week 1, then take 2 tabs daily for week 2, then take 3 tabs daily thereafter    4.  Will meet with Ninfa Matthews in about a month and follow up with us in about 2 months.      Topiramate (Topamax )  What is it used for?  Topiramate helps  patients feel full more quickly and feel less hungry.  It may also help patients binge eat less often.  Topiramate may help you stick to a healthy diet, though used alone, it will not cause weight loss.  Although topiramate is not currently approved by the FDA for weight management, it is used commonly in weight management clinics for this purpose.  Just how topiramate helps with weight loss has not been exactly determined. However it seems to work on areas of the brain to quiet down signals related to eating.      Topiramate may help you:    >feel less interest in eating in between meals   >think less about food and eating   >find it easier to push the plate away   >find giving up pop easier    >have an easier time eating less    For some of our patients, the pills work right away. They feel and think quite differently about food. Other patients don't feel much of a change but find, in fact, they have lost weight! Like all weight loss medications, topiramate works best when you help it work.  This means:   >have less tempting high calorie (fattening) food around the house    >have lower calorie food (fruits, vegetables, low fat meats and dairy) for   snacks    >eat out only one time or less each week.   >eat your meals at a table with the TV or computer off.      How does it work?  Topiramate is a medication that was originally developed to treat seizures in childen and migraine headaches in adults.  It affects chemical messengers in the brain, but the exact way it works to decrease weight is unknown.    How should I take this medication?  Start one tab, 25 mg, for a week.  Increase  to 50 mg (2 tabs) for the next week.  At the third week, take 3 tabs (75 mg).  Stay at 3 tabs until you are seen again.  Is topiramate safe?  Most people tolerate topiramate with no problems.  Please tell your doctor if you have a history of kidney stones, if you are taking phenytoin or birth control pills, or if you are pregnant.   Topiramate is harmful in pregnancy.  Topiramate can decrease your ability to tolerate hot weather.  You should be sure to drink plenty of water to prevent dehydration and kidney stones.  What are the side effects?  Call your doctor right away if you notice any of these side effects:    Change in mood, especially thoughts of suicide    Rash     Pain in your flanks (side and back) or groin  If you notice these less serious side effects, talk with your doctor:    Numbness or tingling in hands and feet    Nausea    Mental fogginess, trouble concentrating, memory problems    Diarrhea    One of the dangers of topiramate is the possibility of birth defects--if you get pregnant when you are taking topiramate, there is the risk that your baby will be born with a cleft lip or palate.  If you are on topiramate and of child bearing age, you need to be on a reliable form of birth control or refrain from sexual intercourse.     Important note:  Topiramate may decrease the effectiveness of birth control pills.                  Follow-ups after your visit        Additional Services     MED THERAPY MANAGE REFERRAL       Your provider has referred you to: **[facility] Scheduling (numerous locations) [phone]   [email or web address]  Weight loss    Reason for Referral: Weight loss MTM Ninfa Matthews 4 weeks    The [facility] department will contact you to schedule an appointment.  You may also schedule the appointment by calling [phone].  For [facility] [place] patients, please call [phone] to confirm/schedule your appointment on the next business day.    This service is designed to help you get the most from your medications.  A specially trained Pharmacist will work closely with you and your providers to solve any questions, concerns, issues or problems related to your medications.    Please bring all of your  prescription and non-prescription medications (such as vitamins, over-the-counter medications, and herbals) or a detailed medication list to your appointment.    If you have a glucose meter or other home monitoring information, please also bring this to your appointment (i.e. blood glucose log, blood pressure log, pain log, etc.).                  Your next 10 appointments already scheduled     Nov 12, 2018 11:00 AM CST   [facility]    [address] [place] [address]  [address]  [facility] [address] [phone]           Bring a current list of meds and any records pertaining to this visit. For Physicals, please bring immunization records and any forms needing to be filled out. Please arrive 10 minutes early to complete paperwork.            Nov 15, 2018 12:00 PM CST   (Arrive by 11:45 AM)   Return Bariatric Nutrition Visit with Gillian Padilla RD   [facility] Surgical [facility] Management ([facility] [place])    [address] [place] [address] [phone]              Who to contact     Please call your clinic at [phone] to:    Ask questions about your health    Make or cancel appointments    Discuss your medicines    Learn about your test results    Speak to your doctor            Additional Information About Your Visit        MyChart Information     mDialog is an electronic gateway that provides easy, online access to your medical records. With mDialog, you can request a clinic appointment, read your test results, renew a prescription or communicate with your care team.     To sign up for ciValuet visit the website at [email or web address]   You will be asked to enter the access code listed below, as well as some personal information. Please follow the directions to create your username and password.     Your access code is: [email or web address]  Expires: 1/7/2019  8:25 AM     Your access  "code will  in 90 days. If you need help or a new code, please contact your [facility] or call [phone] for assistance.        Care EveryWhere ID     This is your Care EveryWhere ID. This could be used by other organizations to access your [facility] medical records  [id number]        Your Vitals Were     Pulse Height Last Period Pulse Oximetry BMI (Body Mass Index)       87 1.651 m (5' 5\") 2018 97% 38.72 kg/m2        Blood Pressure from Last 3 Encounters:   10/12/18 131/82   10/09/18 122/78   18 118/76    Weight from Last 3 Encounters:   10/12/18 105.6 kg (232 lb 11.2 oz)   10/09/18 105.3 kg (232 lb 1.6 oz)   18 102.2 kg (225 lb 4.8 oz)              We Performed the Following     MED THERAPY MANAGE REFERRAL          Today's Medication Changes          These changes are accurate as of 10/12/18 11:49 AM.  If you have any questions, ask your nurse or doctor.               Start taking these medicines.        Dose/Directions    topiramate 25 MG tablet   Commonly known as:  TOPAMAX   Used for:  Morbid obesity (H), Binge eating   Started by:  Ron Ramirez MD        25 mg at bedtime for 1 week, 50 mg at bedtime for 1 week and 75 mg daily at bedtime thereafter   Quantity:  90 tablet   Refills:  11            Where to get your medicines      These medications were sent to [id number] - [place], [place] - [address] [address]     Phone:  [phone]     topiramate 25 MG tablet                Primary Care Provider Office Phone # Fax #    WARD Pardo [phone] [address] [place] [address]        Equal Access to Services     VERITO BURGESS AH: Rick Higuera, wanely mensah, qaybta kaalmada elizabeth, shalini bajwa. So [facility] [phone].    ATENCIÓN: Si habla español, tiene a robin disposición servicios " pete de asistencia lingüística. Daxa vaca [phone].    We comply with applicable federal civil rights laws and [place] laws. We do not discriminate on the basis of race, color, national origin, age, disability, sex, sexual orientation, or gender identity.            Thank you!     Thank you for choosing [facility] MEDICAL WEIGHT MANAGEMENT  for your care. Our goal is always to provide you with excellent care. Hearing back from our patients is one way we can continue to improve our services. Please take a few minutes to complete the written survey that you may receive in the mail after your visit with us. Thank you!             Your Updated Medication List - Protect others around you: Learn how to safely use, store and throw away your medicines at www.disposemymeds.org.          This list is accurate as of 10/12/18 11:49 AM.  Always use your most recent med list.                   Brand Name Dispense Instructions for use Diagnosis    buPROPion 150 MG 24 hr tablet    WELLBUTRIN XL    90 tablet    Take 1 tablet (150 mg) by mouth every morning    Seasonal affective disorder (H)       cetirizine 2.5 mg half-tab    zyrTEC     Take 2.5 mg by mouth daily        EXCEDRIN PO      Take 600 mg by mouth daily        topiramate 25 MG tablet    TOPAMAX    90 tablet    25 mg at bedtime for 1 week, 50 mg at bedtime for 1 week and 75 mg daily at bedtime thereafter    Morbid obesity (H), Binge eating       triamcinolone 0.1 % ointment    KENALOG    30 g    Apply sparingly to affected area three times daily for 14 days.    Rash       * varenicline 0.5 MG X 11 & 1 MG X 42 tablet    CHANTIX STARTING MONTH HUONG    53 tablet    Take 0.5 mg tab daily for 3 days, then 0.5 mg tab twice daily for 4 days, then 1 mg twice daily.    Encounter for smoking cessation counseling       * varenicline 1 MG tablet    CHANTIX    56 tablet    Take 1 tablet (1 mg) by mouth 2 times daily    Encounter for smoking cessation counseling       * Notice:   This list has 2 medication(s) that are the same as other medications prescribed for you. Read the directions carefully, and ask your doctor or other care provider to review them with you.

## 2018-10-12 NOTE — PATIENT INSTRUCTIONS
Welcome to our weight management program!   We are excited to join you on your weight loss journey    Thank you for allowing us the privilege of caring for you. We hope we provided you with the excellent service you deserve.    Please let us know if there is anything else we can do so that we can be sure you are leaving completely satisfied with your care experience.    You saw Dr. Ramirez today.    Instructions per today's visit:   Start Topiramate  Dietitian visit today    Follow up appointments:  [facility] pharmacy visit in 4 weeks  Follow up with Dr. Ramirez in 2 months.    To reach MISAEL Castelan for any questions/concerns call [phone]    To schedule appointments with our team, please call [phone]     Please call during clinic hours Monday through Friday 8:00a - 4:00p if you have questions or you can contact us via TuneStars at anytime. ?    Fax: [phone]    Thank you,  Medical Weight Management Team    1.  Food Goal:  Eat at a table (instead of in front of media) 3 times a week at home. Decrease alcohol to 4 days a week.  Will meet with the nutritionist today. With a calorie goal of around 1300 a day, should lose 1-2 pounds a week without any other interventions.     2.  Activity Goal:  Will walk twice a week for 30 minutes      3.  Medications:  Will start topiramate.  Take 1 tab daily for week 1, then take 2 tabs daily for week 2, then take 3 tabs daily thereafter    4.  Will meet with Ninfa Matthews in about a month and follow up with us in about 2 months.      Topiramate (Topamax )  What is it used for?  Topiramate helps patients feel full more quickly and feel less hungry.  It may also help patients binge eat less often.  Topiramate may help you stick to a healthy diet, though used alone, it will not cause weight loss.  Although topiramate is not currently approved by the FDA for weight management, it is used commonly in weight management clinics for this purpose.  Just how topiramate helps with weight  loss has not been exactly determined. However it seems to work on areas of the brain to quiet down signals related to eating.      Topiramate may help you:    >feel less interest in eating in between meals   >think less about food and eating   >find it easier to push the plate away   >find giving up pop easier    >have an easier time eating less    For some of our patients, the pills work right away. They feel and think quite differently about food. Other patients don't feel much of a change but find, in fact, they have lost weight! Like all weight loss medications, topiramate works best when you help it work.  This means:   >have less tempting high calorie (fattening) food around the house    >have lower calorie food (fruits, vegetables, low fat meats and dairy) for   snacks    >eat out only one time or less each week.   >eat your meals at a table with the TV or computer off.      How does it work?  Topiramate is a medication that was originally developed to treat seizures in childen and migraine headaches in adults.  It affects chemical messengers in the brain, but the exact way it works to decrease weight is unknown.    How should I take this medication?  Start one tab, 25 mg, for a week.  Increase  to 50 mg (2 tabs) for the next week.  At the third week, take 3 tabs (75 mg).  Stay at 3 tabs until you are seen again.  Is topiramate safe?  Most people tolerate topiramate with no problems.  Please tell your doctor if you have a history of kidney stones, if you are taking phenytoin or birth control pills, or if you are pregnant.  Topiramate is harmful in pregnancy.  Topiramate can decrease your ability to tolerate hot weather.  You should be sure to drink plenty of water to prevent dehydration and kidney stones.  What are the side effects?  Call your doctor right away if you notice any of these side effects:    Change in mood, especially thoughts of suicide    Rash     Pain in your flanks (side and back) or  groin  If you notice these less serious side effects, talk with your doctor:    Numbness or tingling in hands and feet    Nausea    Mental fogginess, trouble concentrating, memory problems    Diarrhea    One of the dangers of topiramate is the possibility of birth defects--if you get pregnant when you are taking topiramate, there is the risk that your baby will be born with a cleft lip or palate.  If you are on topiramate and of child bearing age, you need to be on a reliable form of birth control or refrain from sexual intercourse.     Important note:  Topiramate may decrease the effectiveness of birth control pills.

## 2018-11-06 ENCOUNTER — ALLIED HEALTH/NURSE VISIT (OUTPATIENT)
Dept: PHARMACY | Facility: CLINIC | Age: 43
End: 2018-11-06
Attending: PEDIATRICS

## 2018-11-06 DIAGNOSIS — E66.812 CLASS 2 OBESITY WITHOUT SERIOUS COMORBIDITY WITH BODY MASS INDEX (BMI) OF 38.0 TO 38.9 IN ADULT, UNSPECIFIED OBESITY TYPE: Primary | ICD-10-CM

## 2018-11-06 PROCEDURE — 99207 ZZC NO CHARGE LOS: CPT | Performed by: PHARMACIST

## 2018-11-06 NOTE — MR AVS SNAPSHOT
After Visit Summary   11/6/2018    Carri Estrada    MRN: [id number]           Patient Information     Date Of Birth          1975        Visit Information        Provider Department      11/6/2018 8:30 AM Ninfa Matthews RPH [facility]        Today's Diagnoses     Class 2 obesity without serious comorbidity with body mass index (BMI) of 38.0 to 38.9 in adult, unspecified obesity type    -  1       Follow-ups after your visit        Your next 10 appointments already scheduled     Nov 12, 2018 11:00 AM CST   Office Visit with Cristal Gonzalez MD   [facility]    [address]  [address]  [facility] [address] [phone]           Bring a current list of meds and any records pertaining to this visit. For Physicals, please bring immunization records and any forms needing to be filled out. Please arrive 10 minutes early to complete paperwork.            Nov 15, 2018 12:00 PM CST   (Arrive by 11:45 AM)   Return Bariatric Nutrition Visit with Gillian Padilla RD   [facility] Surgical Weight Management ([facility])    [address] [place] [address] [phone]              Who to contact     If you have questions or need follow up information about today's clinic visit or your schedule please contact [facility] directly at [phone].  Normal or non-critical lab and imaging results will be communicated to you by MyChart, letter or phone within 4 business days after the clinic has received the results. If you do not hear from us within 7 days, please contact the clinic through MyChart or phone. If you have a critical or abnormal lab result, we will notify you by phone as soon as possible.  Submit refill requests through [employer] or call your pharmacy and they will forward the refill request to us. Please allow 3 business days for your refill to be completed.  "         Additional Information About Your Visit        MyChart Information     R[employer] Software lets you send messages to your doctor, view your test results, renew your prescriptions, schedule appointments and more. To sign up, go to [email or web address] . Click on \"Log in\" on the left side of the screen, which will take you to the Welcome page. Then click on \"Sign up Now\" on the right side of the page.     You will be asked to enter the access code listed below, as well as some personal information. Please follow the directions to create your username and password.     Your access code is: [email or web address]  Expires: 2019  7:25 AM     Your access code will  in 90 days. If you need help or a new code, please call your [facility] clinic or [phone].        Care EveryWhere ID     This is your Care EveryWhere ID. This could be used by other organizations to access your [facility] medical records  [id number]         Blood Pressure from Last 3 Encounters:   10/12/18 131/82   10/09/18 122/78   18 118/76    Weight from Last 3 Encounters:   10/12/18 232 lb 11.2 oz (105.6 kg)   10/09/18 232 lb 1.6 oz (105.3 kg)   18 225 lb 4.8 oz (102.2 kg)              Today, you had the following     No orders found for display       Primary Care Provider Office Phone # Fax #    Selena Muller, WARD [facility] [phone] [address] [place] [address]        Equal Access to Services     [facility]: Hadii francine bowman hadasho Soomaali, waaxda luqadaha, qaybta kaalmada elizabeth, shalini morrow . So [facility] [phone].    ATENCIÓN: Si habla español, tiene a robin disposición servicios gratuitos de asistencia lingüística. Llame al [phone].    We comply with applicable federal civil rights laws and [place] laws. We do not discriminate on the basis of race, color, national origin, age, disability, sex, sexual orientation, or gender identity.            Thank you!     " Thank you for choosing [facility]  for your care. Our goal is always to provide you with excellent care. Hearing back from our patients is one way we can continue to improve our services. Please take a few minutes to complete the written survey that you may receive in the mail after your visit with us. Thank you!             Your Updated Medication List - Protect others around you: Learn how to safely use, store and throw away your medicines at www.disposemymeds.org.          This list is accurate as of 11/6/18 12:54 PM.  Always use your most recent med list.                   Brand Name Dispense Instructions for use Diagnosis    buPROPion 150 MG 24 hr tablet    WELLBUTRIN XL    90 tablet    Take 1 tablet (150 mg) by mouth every morning    Seasonal affective disorder (H)       cetirizine 2.5 mg half-tab    zyrTEC     Take 2.5 mg by mouth daily        EXCEDRIN PO      Take 600 mg by mouth daily        topiramate 25 MG tablet    TOPAMAX    90 tablet    25 mg at bedtime for 1 week, 50 mg at bedtime for 1 week and 75 mg daily at bedtime thereafter    Morbid obesity (H), Binge eating       triamcinolone 0.1 % ointment    KENALOG    30 g    Apply sparingly to affected area three times daily for 14 days.    Rash       varenicline 1 MG tablet    CHANTIX    56 tablet    Take 1 tablet (1 mg) by mouth 2 times daily    Encounter for smoking cessation counseling

## 2018-11-06 NOTE — PROGRESS NOTES
Clinical Consult                           Carri Estrada is a 43 year old female called for an initial consult. She was referred to me from Dr. Ramirez. Patient is covered for comprehensive MTM visit, but does have high deductible. Patient preferred consult and no MTM visit today.    Discussion: topiramate follow up.    Obesity: Current medication(s) include: Topiramate 50 mg once daily at bedtime. Medication was started on 10/12/18 by Dr. Ramirez. Patient is experiencing the follow side effects: None. Patient feels like the current dose is effective does not want to increase dose.   Diet/Eating Habits: She is vegetarian, Patient reports that appetite is in check. When eating, foods are not tasting the same. Foods higher in carbohydrates  Finding that since starting   Failed medications include: Contrave: has had success in the past, Lorcaserin (Belviq): made sleepy, Phentermine: constipation, sleep issues.   Patient is to schedule follow up appointment with Dr. Ramirez for 1 month from now.     Weight when weight loss medication was started: 10/12/18 232 lb 11.2 oz. BMI 38.8 kg/m^2. Unsure of weight loss/change, patient does not weigh herself at home.     ASSESSMENT:                             Current medications were reviewed today.     Obesity: Stable. Since patient is finding current dose effective, patient to continue and to be reassessed at follow up with Dr. Ramirez.     PLAN:                            1. Patient to schedule follow up appointment with Dr. Ramirez.     2. Patient to continue current regimen.     Ninfa Matthews, PharmD  Medication Therapy Management Pharmacist   [facility]   Phone: [phone]

## 2018-11-06 NOTE — Clinical Note
Hi Dr. Ramirez,   Please see consult as FYI. No action needed. She will be scheduling f/u with you in 1 month.   Ninfa WILKINS

## 2018-11-12 ENCOUNTER — OFFICE VISIT (OUTPATIENT)
Dept: OBGYN | Facility: CLINIC | Age: 43
End: 2018-11-12
Attending: NURSE PRACTITIONER
Payer: COMMERCIAL

## 2018-11-12 VITALS
HEIGHT: 65 IN | SYSTOLIC BLOOD PRESSURE: 126 MMHG | WEIGHT: 227 LBS | BODY MASS INDEX: 37.82 KG/M2 | DIASTOLIC BLOOD PRESSURE: 78 MMHG

## 2018-11-12 DIAGNOSIS — N94.6 DYSMENORRHEA: Primary | ICD-10-CM

## 2018-11-12 DIAGNOSIS — E66.812 OBESITY, CLASS II, BMI 35-39.9: ICD-10-CM

## 2018-11-12 DIAGNOSIS — Z72.0 NICOTINE ABUSE: ICD-10-CM

## 2018-11-12 PROCEDURE — 99204 OFFICE O/P NEW MOD 45 MIN: CPT | Performed by: OBSTETRICS & GYNECOLOGY

## 2018-11-12 NOTE — NURSING NOTE
"Chief Complaint   Patient presents with     Abnormal Bleeding Problem     Dysmennorrhea       Initial /78 (BP Location: Right arm, Patient Position: Sitting, Cuff Size: Adult Large)  Ht 5' 5\" (1.651 m)  Wt 227 lb (103 kg)  LMP 10/22/2018 (Exact Date)  Breastfeeding? No  BMI 37.77 kg/m2 Estimated body mass index is 37.77 kg/(m^2) as calculated from the following:    Height as of this encounter: 5' 5\" (1.651 m).    Weight as of this encounter: 227 lb (103 kg).  BP completed using cuff size: large    Questioned patient about current smoking habits.  Pt. currently smokes.  Advised about smoking cessation.    The following HM Due: NONE      Vinicio Abebe CMA                "

## 2018-11-12 NOTE — PROGRESS NOTES
SUBJECTIVE:   CC: Dysmenorrhea                                               Carrisheng Estrada is a 43 year old  female who presents to clinic today for dysmenorrhea. Recently menses are increasingly uncomfortable over the past 3 years, very consistent in the last year, did not have a history of cramps previously. Menses last 3 days with spotting for 3 days after. Some nights the cramping is so bad she can't sleep. Takes 3 ibuprofen with minimal improvement. Pain extends from her back down to her knees, including her pelvis. Cramping becomes uncomfortable 1 week prior to menses, increasingly painful and then terrible on night 2. Has some clots, changes pads every 4 hours. Tampons have become uncomfortable, she can't stand the odor or the feel.    Menarche age 14, no pain.     Pamprin and Midol give her restless legs.     Of note, she is a current every day smoker, on Chantix.   She is undergoing medical management for obesity with endocrine.     Problem list and histories reviewed & adjusted, as indicated.  Additional history: as documented.    PELVIC ULTRASOUND WITH ENDOVAGINAL TRANSDUCER IMAGING   10/10/2018  4:34 PM      HISTORY:  Dysmenorrhea.     TECHNIQUE:  Endovaginal sonography was added to the transabdominal  exam to better evaluate the uterus and ovaries because of inadequate  bladder fullness.     COMPARISON: None.     FINDINGS:  The uterus measures 7.6 x 5.3 x 4.6 cm.  No focal lesions  are seen in the myometrium.  Endometrium is 7 mm thick and appears  normal.       The ovaries are normal in size with no focal lesions.  There are no  adnexal masses.  There is no free fluid in the cul-de-sac.         IMPRESSION:   Normal pelvic ultrasound.     IGOR CURRY MD    ROS:  Const: + weight gain, no fever, chills  : no dysuria, hematuria, urinary frequency, urgency, hesitancy  GI: no constipation, diarrhea, abdominal pain  Breast: no nipple discharge, skin changes, lumps  Heme: no history blood clots  or easy bruising/bleeding  Neuro: no Hx migraine, no aura  Endo: no heat or cold intolerance, fatigue  Psych: mood stable, no anxiety, depression  CV: no chest pain, palpitations  Pulm: no shortness of breath, chest tightness, cough, wheeze  Skin: no rashes, skin changes     There is no problem list on file for this patient.    History reviewed. No pertinent surgical history.   Social History   Substance Use Topics     Smoking status: Current Every Day Smoker     Packs/day: 0.50     Years: 35.00     Types: Cigarettes     Smokeless tobacco: Never Used     Alcohol use Yes      Comment: couple drinks a day in the evening       Problem (# of Occurrences) Relation (Name,Age of Onset)    Anxiety Disorder (3) Mother (Adelina), Father (Dick), Brother (Grupo)    Breast Cancer (1) Paternal Grandmother (Matilde)    Cancer (1) Mother (Adelina)    Depression (3) Mother (Adelina), Father (Dick), Brother (Grupo)    Family History Negative (2) Brother, Sister    Hyperlipidemia (1) Father (Dick)    Hypertension (1) Father (Dick)    Myocardial Infarction (1) Father (Dick)    Obesity (3) Mother (Adelina), Father (Dick), Paternal Grandmother (Matilde)    Other Cancer (1) Mother (Adelina)       Negative family history of: Cerebrovascular Disease              Current Outpatient Prescriptions on File Prior to Visit:  Aspirin-Acetaminophen-Caffeine (EXCEDRIN PO) Take 600 mg by mouth daily   buPROPion (WELLBUTRIN XL) 150 MG 24 hr tablet Take 1 tablet (150 mg) by mouth every morning   cetirizine (ZYRTEC) 2.5 mg half-tab Take 2.5 mg by mouth daily   topiramate (TOPAMAX) 25 MG tablet 25 mg at bedtime for 1 week, 50 mg at bedtime for 1 week and 75 mg daily at bedtime thereafter   triamcinolone (KENALOG) 0.1 % ointment Apply sparingly to affected area three times daily for 14 days.   varenicline (CHANTIX) 1 MG tablet Take 1 tablet (1 mg) by mouth 2 times daily     No current facility-administered medications on file prior to visit.   Allergies   Allergen  "Reactions     Foeniculum Vulgare Swelling     No Clinical Screening - See Comments      Other reaction(s): Edema       OBJECTIVE:   /78 (BP Location: Right arm, Patient Position: Sitting, Cuff Size: Adult Large)  Ht 5' 5\" (1.651 m)  Wt 227 lb (103 kg)  LMP 10/22/2018 (Exact Date)  Breastfeeding? No  BMI 37.77 kg/m2   Const: sitting in chair in no acute distress, comfortable  Eyes: no scleral icterus, EOMI  CV: regular rate, well perfused  Pulm: no increased work of breathing, no cough  Skin: warm and dry, no rashes/lesions  Psych: mood stable, appropriate affect  Abd: soft, no hepatosplenomegaly, bilateral point tenderness at lateral inferior aspect of rectus sheath   Neuro: A+Ox3   : External genitalia normal well-estrogenized, healthy tissue.  No obvious excoriations, lesions, or rashes. Bartholins, urethra, normal.  Normal moist pink vaginal mucosa.  SSE: Normal cervix, normal physiologic discharge.   Bimanual: No CMT, small mobile uterus. No adnexal masses, mild tenderness appreciated.     ASSESSMENT/PLAN:                                                    Carri Estrada is a 43 year old female  here for evaluation and management of dysmenorrhea. US reviewed, no evidence of structural abnormalities contributing to dysmenorrhea. We reviewed the most likely non-structural causes of dysmenorrhea including endometriosis, adenomyosis, and increased prostaglandin activity, often none of these are demonstrated on US. Management options discussed include oral contraceptives for which she is not a good candidate due to smoking, age >35, and obesity. Progesterone only options discussed, discouraged depo provera due to risk of weight gain. Encouraged mirena IUD, patient will read brochure and return for placement.     She is aware that OCPs are only an option if she completely stops smoking.       Cristal Gonzalez MD  Obstetrics and Gynecology   [facility] [place]   "

## 2018-11-12 NOTE — MR AVS SNAPSHOT
"              After Visit Summary   11/12/2018    Carri Estrada    MRN: [id number]           Patient Information     Date Of Birth          1975        Visit Information        Provider Department      11/12/2018 11:00 AM Cristal Gonzalez MD [facility]         Follow-ups after your visit        Your next 10 appointments already scheduled     Nov 15, 2018 11:15 AM CST   [facility]    [address]  [address]  [facility] [id number] [phone]            Nov 15, 2018 12:00 PM CST   (Arrive by 11:45 AM)   Return Bariatric Nutrition Visit with Gillian Padilla RD   [facility] ([facility])    [address]  [address] [facility] [address] [phone]              Who to contact     If you have questions or need follow up information about today's clinic visit or your schedule please contact [facility] directly at [phone].  Normal or non-critical lab and imaging results will be communicated to you by MyChart, letter or phone within 4 business days after the clinic has received the results. If you do not hear from us within 7 days, please contact the clinic through MyChart or phone. If you have a critical or abnormal lab result, we will notify you by phone as soon as possible.  Submit refill requests through Cozy or call your pharmacy and they will forward the refill request to us. Please allow 3 business days for your refill to be completed.          Additional Information About Your Visit        MyChart Information     Cozy lets you send messages to your doctor, view your test results, renew your prescriptions, schedule appointments and more. To sign up, go to [email or web address] . Click on \"Log in\" on the left side of the screen, which will take you to the Welcome page. Then click on \"Sign up Now\" on the right side " "of the page.     You will be asked to enter the access code listed below, as well as some personal information. Please follow the directions to create your username and password.     Your access code is: [email or web address]  Expires: 2019  7:25 AM     Your access code will  in 90 days. If you need help or a new code, please call your [place] clinic or [phone].        Care EveryWhere ID     This is your Care EveryWhere ID. This could be used by other organizations to access your [place] medical records  [id number]        Your Vitals Were     Height Last Period Breastfeeding? BMI (Body Mass Index)          5' 5\" (1.651 m) 10/22/2018 (Exact Date) No 37.77 kg/m2         Blood Pressure from Last 3 Encounters:   18 126/78   10/12/18 131/82   10/09/18 122/78    Weight from Last 3 Encounters:   18 227 lb (103 kg)   10/12/18 232 lb 11.2 oz (105.6 kg)   10/09/18 232 lb 1.6 oz (105.3 kg)              Today, you had the following     No orders found for display       Primary Care Provider Office Phone # Fax #    WARD Pardo [facility] [phone] [address] [place] [address]        Equal Access to Services     [facility]: Hadii aad ku hadasho Soomaali, waaxda luqadaha, qaybta kaalmada adeegyada, shalini morrow . So [facility] [phone].    ATENCIÓN: Si habla español, tiene a robin disposición servicios gratuitos de asistencia lingüística. Llame al [phone].    We comply with applicable federal civil rights laws and [place] laws. We do not discriminate on the basis of race, color, national origin, age, disability, sex, sexual orientation, or gender identity.            Thank you!     [facility]  [facility] your [facility]. Our goal is always to provide you with excellent care. Hearing back from our patients is one way we can continue to improve our services. Please take a few minutes to complete the written " survey that you may receive in the mail after your visit with us. Thank you!             Your Updated Medication List - Protect others around you: Learn how to safely use, store and throw away your medicines at www.disposemymeds.org.          This list is accurate as of 11/12/18 11:51 AM.  Always use your most recent med list.                   Brand Name Dispense Instructions for use Diagnosis    buPROPion 150 MG 24 hr tablet    WELLBUTRIN XL    90 tablet    Take 1 tablet (150 mg) by mouth every morning    Seasonal affective disorder (H)       cetirizine 2.5 mg half-tab    zyrTEC     Take 2.5 mg by mouth daily        EXCEDRIN PO      Take 600 mg by mouth daily        topiramate 25 MG tablet    TOPAMAX    90 tablet    25 mg at bedtime for 1 week, 50 mg at bedtime for 1 week and 75 mg daily at bedtime thereafter    Morbid obesity (H), Binge eating       triamcinolone 0.1 % ointment    KENALOG    30 g    Apply sparingly to affected area three times daily for 14 days.    Rash       varenicline 1 MG tablet    CHANTIX    56 tablet    Take 1 tablet (1 mg) by mouth 2 times daily    Encounter for smoking cessation counseling

## 2018-11-15 ENCOUNTER — OFFICE VISIT (OUTPATIENT)
Dept: OBGYN | Facility: CLINIC | Age: 43
End: 2018-11-15
Payer: COMMERCIAL

## 2018-11-15 ENCOUNTER — TELEPHONE (OUTPATIENT)
Dept: OBGYN | Facility: CLINIC | Age: 43
End: 2018-11-15

## 2018-11-15 VITALS — BODY MASS INDEX: 38.14 KG/M2 | SYSTOLIC BLOOD PRESSURE: 128 MMHG | DIASTOLIC BLOOD PRESSURE: 80 MMHG | WEIGHT: 229.2 LBS

## 2018-11-15 DIAGNOSIS — Z30.430 ENCOUNTER FOR INSERTION OF INTRAUTERINE CONTRACEPTIVE DEVICE: ICD-10-CM

## 2018-11-15 DIAGNOSIS — Z30.430 ENCOUNTER FOR IUD INSERTION: ICD-10-CM

## 2018-11-15 DIAGNOSIS — Z01.812 PRE-PROCEDURE LAB EXAM: Primary | ICD-10-CM

## 2018-11-15 DIAGNOSIS — R10.2 PELVIC PAIN IN FEMALE: Primary | ICD-10-CM

## 2018-11-15 LAB — HCG, QUAL URINE: NEGATIVE

## 2018-11-15 PROCEDURE — 84703 CHORIONIC GONADOTROPIN ASSAY: CPT | Performed by: OBSTETRICS & GYNECOLOGY

## 2018-11-15 PROCEDURE — 58300 INSERT INTRAUTERINE DEVICE: CPT | Performed by: OBSTETRICS & GYNECOLOGY

## 2018-11-15 RX ORDER — HYDROCODONE BITARTRATE AND ACETAMINOPHEN 5; 325 MG/1; MG/1
1 TABLET ORAL EVERY 4 HOURS PRN
Qty: 3 TABLET | Refills: 0 | Status: SHIPPED | OUTPATIENT
Start: 2018-11-15 | End: 2018-12-13

## 2018-11-15 NOTE — TELEPHONE ENCOUNTER
Placed order for 3 tabs of hydrocodone for significant pain following IUD insertion today. Will plan US to evaluate positioning tomorrow.     Cristal Gonzalez MD

## 2018-11-15 NOTE — TELEPHONE ENCOUNTER
"Patient calling:  Had IUD placed today.  Has take 7 Advil tabs today. Last dose 400 mg at 3, 400 mg at 1 and 600 mg at 10  Lasting for about 30 min.  Having lower pelvic pain since left office.  Would like to know if she can have something for pain.  Pain \"tops out at a 8.\" worse that what she has experienced in the past.  Please advise.  Meena Hagan RN  Spoke with Dr Gonzalez. And patient informed of recommendations.  Recommend Ultrasound tomorrow. (ordered) pt will call to schedule. # given.  Can alternate Ibuprofen with tylenol. (unable to take) per pt.  Will leave RX at desk FR PP.  ( will )  Meena Hagan RN              "

## 2018-11-15 NOTE — MR AVS SNAPSHOT
After Visit Summary   11/15/2018    Carri Estrada    MRN: [id number]           Patient Information     Date Of Birth          1975        Visit Information        Provider Department      11/15/2018 11:15 AM Cristal Gonzalez MD [facility]        Today's Diagnoses     Pre-procedure lab exam    -  1    Encounter for insertion of intrauterine contraceptive device        Mirena IUD placed 11/15/18- Remove by 11/15/2023           Follow-ups after your visit        Your next 10 appointments already scheduled     Dec 13, 2018  9:30 AM CST   (Arrive by 9:15 AM)   Return Bariatric Nutrition Visit with Gillian Padilla RD   [facility] ([facility] [place])    [address]  4th [address]  [facility] [address] [phone]            Dec 13, 2018  3:00 PM CST   SHORT with Cristal Gonzalez MD   [facility] ([facility])    [address]  [address]  [facility] [address] [phone]              Who to contact     If you have questions or need follow up information about today's clinic visit or your schedule please contact [facility] directly at [phone].  Normal or non-critical lab and imaging results will be communicated to you by MyChart, letter or phone within 4 business days after the clinic has received the results. If you do not hear from us within 7 days, please contact the clinic through MyChart or phone. If you have a critical or abnormal lab result, we will notify you by phone as soon as possible.  Submit refill requests through [employer] or call your pharmacy and they will forward the refill request to us. Please allow 3 business days for your refill to be completed.          Additional Information About Your Visit        [employer] Information     [employer] lets you send messages to your doctor, view your test results, renew your prescriptions, schedule appointments  "and more. To sign up, go to [email or web address] . Click on \"Log in\" on the left side of the screen, which will take you to the Welcome page. Then click on \"Sign up Now\" on the right side of the page.     You will be asked to enter the access code listed below, as well as some personal information. Please follow the directions to create your username and password.     Your access code is: [email or web address]  Expires: 2019  7:25 AM     Your access code will  in 90 days. If you need help or a new code, please call your [facility] clinic or [phone].        Care EveryWhere ID     This is your Care EveryWhere ID. This could be used by other organizations to access your [facility] medical records  [id number]        Your Vitals Were     Last Period BMI (Body Mass Index)                10/22/2018 (Exact Date) 38.14 kg/m2           Blood Pressure from Last 3 Encounters:   11/15/18 128/80   18 126/78   10/12/18 131/82    Weight from Last 3 Encounters:   11/15/18 229 lb 3.2 oz (104 kg)   18 227 lb (103 kg)   10/12/18 232 lb 11.2 oz (105.6 kg)              We Performed the Following     HC LEVONORGESTREL IU 52MG 5 YR     HCL HCG, URINE, NURSE BACKOFFICE     INSERTION INTRAUTERINE DEVICE          Today's Medication Changes          These changes are accurate as of 11/15/18  4:17 PM.  If you have any questions, ask your nurse or doctor.               Start taking these medicines.        Dose/Directions    levonorgestrel 20 MCG/24HR IUD   Commonly known as:  MIRENA   Used for:  Encounter for insertion of intrauterine contraceptive device, Encounter for IUD insertion   Started by:  Cristal Gonzalez MD        Dose:  1 each   1 each (20 mcg) by Intrauterine route continuous   Refills:  0            Where to get your medicines      Some of these will need a paper prescription and others can be bought over the counter.  Ask your nurse if you have questions.     You don't need a prescription for these medications "     levonorgestrel 20 MCG/24HR IUD                Primary Care Provider Office Phone # Fax #    Selena Muller, APRN [facility] [phone]       [facility] [place] [address]        Equal Access to Services     [facility] : Hadii francine bowman karrio Soomaali, waaxda luqadaha, qaybta kaalmada adeegyada, waxlawrence leonin niralin marvdaren yun odalys bajwa. So [facility] [phone].    ATENCIÓN: Si habla español, tiene a robin disposición servicios gratuitos de asistencia lingüística. Llame al [phone].    We comply with applicable federal civil rights laws and [place] laws. We do not discriminate on the basis of race, color, national origin, age, disability, sex, sexual orientation, or gender identity.            Thank you!     Thank you for choosing [facility]  for your care. Our goal is always to provide you with excellent care. Hearing back from our patients is one way we can continue to improve our services. Please take a few minutes to complete the written survey that you may receive in the mail after your visit with us. Thank you!             Your Updated Medication List - Protect others around you: Learn how to safely use, store and throw away your medicines at www.disposemymeds.org.          This list is accurate as of 11/15/18  4:17 PM.  Always use your most recent med list.                   Brand Name Dispense Instructions for use Diagnosis    buPROPion 150 MG 24 hr tablet    WELLBUTRIN XL    90 tablet    Take 1 tablet (150 mg) by mouth every morning    Seasonal affective disorder (H)       cetirizine 2.5 mg half-tab    zyrTEC     Take 2.5 mg by mouth daily        EXCEDRIN PO      Take 600 mg by mouth daily        levonorgestrel 20 MCG/24HR IUD    MIRENA     1 each (20 mcg) by Intrauterine route continuous    Encounter for insertion of intrauterine contraceptive device, Encounter for IUD insertion       topiramate 25 MG tablet    TOPAMAX    90 tablet    25 mg at bedtime for  1 week, 50 mg at bedtime for 1 week and 75 mg daily at bedtime thereafter    Morbid obesity (H), Binge eating       triamcinolone 0.1 % ointment    KENALOG    30 g    Apply sparingly to affected area three times daily for 14 days.    Rash       varenicline 1 MG tablet    CHANTIX    56 tablet    Take 1 tablet (1 mg) by mouth 2 times daily    Encounter for smoking cessation counseling

## 2018-11-15 NOTE — NURSING NOTE
"Chief Complaint   Patient presents with     IUD     Pt is here to have Mirena IUD placed to help with heavy bleeding during period       Initial /80 (BP Location: Left arm, Patient Position: Chair, Cuff Size: Adult Large)  Wt 229 lb 3.2 oz (104 kg)  LMP 10/22/2018 (Exact Date)  BMI 38.14 kg/m2 Estimated body mass index is 38.14 kg/(m^2) as calculated from the following:    Height as of 18: 5' 5\" (1.651 m).    Weight as of this encounter: 229 lb 3.2 oz (104 kg).  BP completed using cuff size: large    Questioned patient about current smoking habits.  Pt. currently smokes.  Advised about smoking cessation.          The following HM Due: NONE      Johnnie Hernandez CMA               "

## 2018-11-15 NOTE — PROGRESS NOTES
INDICATIONS:                                                      Carri Estrada is a 43 year old female,   who presents for insertion of an IUD for dysmenorrhea. The risks, benefits and alternatives of IUD insertion were discussed in detail previously. She also has reviewed the product brochure.  She has elected to go ahead with the insertion  today and her questions were answered. Consent was signed. Her LMP began on Patient's last menstrual period was 10/22/2018 (exact date).  and was normal in duration and amount of flow. A pregnancy test was performed today:  And was negative    PROCEDURE:                                                    /80 (BP Location: Left arm, Patient Position: Chair, Cuff Size: Adult Large)  Wt 229 lb 3.2 oz (104 kg)  LMP 10/22/2018 (Exact Date)  BMI 38.14 kg/m2   The pelvic exam revealed normal external genitalia. On bimanual exam the uterus was Anteverted and Retroverted and normal in size with no tenderness present. A speculum was inserted into the vagina and the cervix was visualized. The cervix was prepped with Betadine . The anterior lip of the cervix was grasped with a single toothed tenaculum. The cervix was dilated serially to allow the pipelle to pass. The uterus sounded to 9 cm. A Mirena IUD was then inserted without difficulty. The string was cut to 2 cm.  The patient experienced a mild amount of cramping.    POST PROCEDURE:                                                      She  tolerated the procedure well. There were no complications. Patient was discharged in stable condition.      Return to clinic in 4-5 weeks for IUD check.  Call if severe cramping, fever, abnormal bleeding, abnormal discharge or pelvic pain develop.  Patient was counseled about the chance of irregular bleeding.     Cristal Gonzalez MD

## 2018-11-16 NOTE — PROGRESS NOTES
Results discussed directly with patient in clinic. Further details documented in the note.     Cristal Gonzalez MD

## 2018-11-16 NOTE — TELEPHONE ENCOUNTER
"  [facility] Triage Call  Presenting Problem:[facility] calling regarding RX   HYDROcodone-acetaminophen (NORCO) 5-325 MG per tablet 3 tablet 0 11/15/2018  --   Sig - Route: Take 1 tablet by mouth every 4 hours as needed for pain - Oral     \"Patient has the RX but there is no YAHIR on it. Can you verify she was seen or the MD gave it today?\"   Patient Recommendations/Teaching:Yes per MD/epic. They will look up MD in their system.  Nery Giraldo RN [facility]            "

## 2018-12-13 ENCOUNTER — OFFICE VISIT (OUTPATIENT)
Dept: OBGYN | Facility: CLINIC | Age: 43
End: 2018-12-13
Payer: COMMERCIAL

## 2018-12-13 VITALS — WEIGHT: 231.8 LBS | SYSTOLIC BLOOD PRESSURE: 114 MMHG | DIASTOLIC BLOOD PRESSURE: 70 MMHG | BODY MASS INDEX: 38.57 KG/M2

## 2018-12-13 DIAGNOSIS — N94.6 DYSMENORRHEA: Primary | ICD-10-CM

## 2018-12-13 PROCEDURE — 99213 OFFICE O/P EST LOW 20 MIN: CPT | Performed by: OBSTETRICS & GYNECOLOGY

## 2018-12-13 NOTE — PROGRESS NOTES
Gyn Clinic Visit  2018    CC: IUD follow up    S: Carri Estrada is a 43 year old  here for follow up visit after placement of mirena IUD on 11/15/18 for dysmenorrhea. She reports daily spotting, no cramping, no pain with intercourse. Overall happy with IUD but feeling davis this week. Would expect menses soon.    O:   /70 (BP Location: Left arm, Patient Position: Chair, Cuff Size: Adult Large)   Wt 105.1 kg (231 lb 12.8 oz)   LMP 2018   BMI 38.57 kg/m    Gen: resting comfortably, in NAD  : External genitalia normal well-estrogenized, healthy tissue.  No obvious excoriations, lesions, or rashes. Bartholins, urethra, skeins normal.  Normal pink vaginal mucosa.  SSE: Normal cervix, normal physiologic discharge, IUD strings present at external cervical os, 1cm long.     A: Carri Estrada is a 43 year old female  here for follow up of IUD. No concerns at this time.     P:   - Progesterone withdrawal if ongoing spotting  - IUD due out 2023  - Return for annual gyn exams and routine pap smears or for any new onset symptoms or concerns.      Cristal Gonzalez MD   Obstetrics and Gynecology

## 2019-07-10 ENCOUNTER — MYC MEDICAL ADVICE (OUTPATIENT)
Dept: PEDIATRICS | Facility: CLINIC | Age: 44
End: 2019-07-10

## 2019-07-11 ENCOUNTER — APPOINTMENT (OUTPATIENT)
Dept: GENERAL RADIOLOGY | Facility: CLINIC | Age: 44
End: 2019-07-11
Attending: EMERGENCY MEDICINE
Payer: COMMERCIAL

## 2019-07-11 ENCOUNTER — HOSPITAL ENCOUNTER (EMERGENCY)
Facility: CLINIC | Age: 44
Discharge: HOME | End: 2019-07-11
Attending: EMERGENCY MEDICINE | Admitting: EMERGENCY MEDICINE
Payer: COMMERCIAL

## 2019-07-11 VITALS
DIASTOLIC BLOOD PRESSURE: 87 MMHG | RESPIRATION RATE: 17 BRPM | SYSTOLIC BLOOD PRESSURE: 145 MMHG | HEART RATE: 84 BPM | TEMPERATURE: 98.8 F | OXYGEN SATURATION: 99 %

## 2019-07-11 DIAGNOSIS — R07.9 CHEST PAIN, UNSPECIFIED TYPE: ICD-10-CM

## 2019-07-11 LAB
ANION GAP SERPL CALCULATED.3IONS-SCNC: 5 MMOL/L (ref 3–14)
BASOPHILS # BLD AUTO: 0 10E9/L (ref 0–0.2)
BASOPHILS NFR BLD AUTO: 0.2 %
BUN SERPL-MCNC: 10 MG/DL (ref 7–30)
CALCIUM SERPL-MCNC: 8.6 MG/DL (ref 8.5–10.1)
CHLORIDE SERPL-SCNC: 108 MMOL/L (ref 94–109)
CO2 SERPL-SCNC: 25 MMOL/L (ref 20–32)
CREAT SERPL-MCNC: 0.65 MG/DL (ref 0.52–1.04)
D DIMER PPP FEU-MCNC: <0.3 UG/ML FEU (ref 0–0.5)
DIFFERENTIAL METHOD BLD: NORMAL
EOSINOPHIL # BLD AUTO: 0.1 10E9/L (ref 0–0.7)
EOSINOPHIL NFR BLD AUTO: 0.8 %
ERYTHROCYTE [DISTWIDTH] IN BLOOD BY AUTOMATED COUNT: 13.1 % (ref 10–15)
GFR SERPL CREATININE-BSD FRML MDRD: >90 ML/MIN/{1.73_M2}
GLUCOSE SERPL-MCNC: 112 MG/DL (ref 70–99)
HCG SERPL QL: NEGATIVE
HCT VFR BLD AUTO: 41.7 % (ref 35–47)
HGB BLD-MCNC: 13.6 G/DL (ref 11.7–15.7)
IMM GRANULOCYTES # BLD: 0.1 10E9/L (ref 0–0.4)
IMM GRANULOCYTES NFR BLD: 0.5 %
INTERPRETATION ECG - MUSE: NORMAL
LYMPHOCYTES # BLD AUTO: 1.7 10E9/L (ref 0.8–5.3)
LYMPHOCYTES NFR BLD AUTO: 15.1 %
MCH RBC QN AUTO: 31.7 PG (ref 26.5–33)
MCHC RBC AUTO-ENTMCNC: 32.6 G/DL (ref 31.5–36.5)
MCV RBC AUTO: 97 FL (ref 78–100)
MONOCYTES # BLD AUTO: 0.9 10E9/L (ref 0–1.3)
MONOCYTES NFR BLD AUTO: 7.9 %
NEUTROPHILS # BLD AUTO: 8.2 10E9/L (ref 1.6–8.3)
NEUTROPHILS NFR BLD AUTO: 75.5 %
NRBC # BLD AUTO: 0 10*3/UL
NRBC BLD AUTO-RTO: 0 /100
PLATELET # BLD AUTO: 241 10E9/L (ref 150–450)
POTASSIUM SERPL-SCNC: 3.7 MMOL/L (ref 3.4–5.3)
RBC # BLD AUTO: 4.29 10E12/L (ref 3.8–5.2)
SODIUM SERPL-SCNC: 138 MMOL/L (ref 133–144)
TROPONIN I SERPL-MCNC: <0.015 UG/L (ref 0–0.04)
WBC # BLD AUTO: 10.9 10E9/L (ref 4–11)

## 2019-07-11 PROCEDURE — 84484 ASSAY OF TROPONIN QUANT: CPT | Performed by: EMERGENCY MEDICINE

## 2019-07-11 PROCEDURE — 71046 X-RAY EXAM CHEST 2 VIEWS: CPT

## 2019-07-11 PROCEDURE — 85379 FIBRIN DEGRADATION QUANT: CPT | Performed by: EMERGENCY MEDICINE

## 2019-07-11 PROCEDURE — 80048 BASIC METABOLIC PNL TOTAL CA: CPT | Performed by: EMERGENCY MEDICINE

## 2019-07-11 PROCEDURE — 36415 COLL VENOUS BLD VENIPUNCTURE: CPT | Performed by: EMERGENCY MEDICINE

## 2019-07-11 PROCEDURE — 84703 CHORIONIC GONADOTROPIN ASSAY: CPT | Performed by: EMERGENCY MEDICINE

## 2019-07-11 PROCEDURE — 93005 ELECTROCARDIOGRAM TRACING: CPT

## 2019-07-11 PROCEDURE — 99285 EMERGENCY DEPT VISIT HI MDM: CPT | Mod: 25

## 2019-07-11 PROCEDURE — 85025 COMPLETE CBC W/AUTO DIFF WBC: CPT | Performed by: EMERGENCY MEDICINE

## 2019-07-11 ASSESSMENT — ENCOUNTER SYMPTOMS: SHORTNESS OF BREATH: 1

## 2019-07-11 NOTE — TELEPHONE ENCOUNTER
After my charting back and forth I called pt  She was able to talk without difficulty but c/o months of sob since quitting smoking in 10/18  takes excedrin - took one this am at 8am  Woke up this am with chest pain/pressure is two fingers over from the sternum  Radiating to back  Dad had first MI at 49  Pt has smoking history since I think the age of 12  Advised to go to [facility]  Pt in car and is driving herself there  Report called to MATTHEW lorenzo at [facility]  Marcie Munroe RN

## 2019-07-11 NOTE — ED AVS SNAPSHOT
[facility] Emergency Department  [address]  [facility] [address]  Phone:  [phone]  Fax:  [phone]                                    Carri Estrada   MRN: [id number]    Department:  [facility] Emergency Department   Date of Visit:  7/11/2019           After Visit Summary Signature Page    I have received my discharge instructions, and my questions have been answered. I have discussed any challenges I see with this plan with the nurse or doctor.    ..........................................................................................................................................  Patient/Patient Representative Signature      ..........................................................................................................................................  Patient Representative Print Name and Relationship to Patient    ..................................................               ................................................  Date                                   Time    ..........................................................................................................................................  Reviewed by Signature/Title    ...................................................              ..............................................  Date                                               Time          22EPIC Rev 08/18

## 2019-07-11 NOTE — ED PROVIDER NOTES
History     Chief Complaint:    Chest Pain and Shortness of Breath     HPI   Carri Estrada is a 44 year old female who presents with chest pain and shortness of breath. The patient reports that in Novemeber she quit smoking, of which she had been smoking since 6th grade. She notes that since then she has been experiencing shortness of breath that has been worsening and making it difficult to sleep while laying flat on her back. The patient states that she then will turn to her side to improve her breathing. Yesterday, the patient reports that she called her primary care physician to make an appointment for her shortness of breath, however they could not get her in for an appointment for a few months. This morning she notes that she woke up around 0800 laying flat on her back and when she sat up she felt immediate pain her chest that she details is mid sternum with 2 finger space over to the right. She expresses that the pain did not subside and eventually started to radiate into her shoulder. The patient notes that when she is not moving she does not feel the pain but it worsens when she moves her arm. The patient denies any recent strain or new exercises. She states that she sometimes uses her son's inhaler for symptoms of shortness of breath.     Allergies:  Foeniculum Vulgare     Medications:    Zyrtec  Mirena      Past Medical History:    Depression  Asthma  Obesity  Nicotine abuse  Dysmenorrhea     Past Surgical History:    Past surgical history reviewed. No pertinent past surgical history.     Family History:    Mother: cancer, anxiety, depression, obesity  Father: MI, anxiety, depression, hypertension, hyperlipidemia, obesity   Paternal grandmother: breast cancer, obesity     Social History:  The patient was accompanied to the ED by herself.  Smoking Status: Former Smoker  Smokeless Tobacco: Never Used  Alcohol Use: Positive  Drug Use: Negative  PCP: Selena Muller   Marital Status:         Review of Systems   Respiratory: Positive for shortness of breath.    Cardiovascular: Positive for chest pain.   Musculoskeletal:        Radiating shoulder pain   All other systems reviewed and are negative.    Physical Exam     Patient Vitals for the past 24 hrs:   BP Temp Temp src Pulse Heart Rate Resp SpO2   07/11/19 1415 -- -- -- -- 90 9 99 %   07/11/19 1400 126/81 -- -- 86 92 11 96 %   07/11/19 1345 134/77 -- -- 90 89 10 94 %   07/11/19 1330 141/90 -- -- 88 96 11 97 %   07/11/19 1318 (!) 166/103 98.8  F (37.1  C) Temporal -- 111 20 99 %        Physical Exam  VS: Reviewed per above  HENT: Mucous membranes moist  EYES: sclera anicteric  CV: Rate as noted, regular rhythm.   RESP: Effort normal. Breath sounds are normal bilaterally.  NEURO: Alert, moving all extremities  MSK: No deformity of the extremities. TTP over the right anterior, mid costochondral region. No overlying warmth, redness.  SKIN: Warm and dry    Emergency Department Course     ECG:  ECG taken at 1319, ECG read at 1428  Normal sinus rhythm  Nonspecific ST abnoramlity  Abnormal ECG  Rate 100 bpm. MD interval 146 ms. QRS duration 80 ms. QT/QTc 340/438 ms. P-R-T axes 54 -4 25.    Imaging:  Radiology findings were communicated with the patient who voiced understanding of the findings.    XR Chest 2 Views  Negative chest. Lungs clear.  GUILLERMO JACOBSON MD  Reading per radiology    Laboratory:  Laboratory findings were communicated with the patient who voiced understanding of the findings.    D Dimer: <0.3  CBC: WBC 10.9, HGB 13.6,   BMP: Glucose 112 (H) o/w WNL (Creatinine 0.65)  Troponin (Collected 1335): <0.015  HCG Qualitative: Negative     Emergency Department Course:    1319 EKG obtained as noted above.     1335 IV was inserted and blood was drawn for laboratory testing, results above.     1416 Nursing notes and vitals reviewed. I performed an exam of the patient as documented above.     1422 Blood drawn. This was sent to the lab for  further testing, results above.     1540 The patient was sent for a XR while in the emergency department, results above.      1557 Prior to discharve, I personally reviewed the imaging, lab, and EKG results with the patient and answered all related questions. Patient is amenable to plan.     Impression & Plan      Medical Decision Making:  Carri Estrada is a 44 year old female who presents to the emergency department today for evaluation of subacute shortness of breath, 1 day of chest pain.  Initial vital signs notable for heart rate of 111, blood pressure 166/103.  Exam reveals clear lungs, reproducible tenderness over the right anterior chest wall near the costochondral junction.  CXR did not reveal wide mediastinum and nature of pain not c/w aortic dissection. No radiographic evidence of pneumothorax nor PNA nor pulmonary edema either. Lungs clear without wheezing. Doubt COPD/asthma exacerbation. Hx and lack of pneumomediastinum on CXR make boerhaave's syndrome unlikely. Based on Well's and PERC criteria, PE was also deemed unlikely. ECG did not show signs of STEMI nor other specific signs of ischemia. Troponin testing was negative, thus no signs of acute MI. Based on exam a musculoskeletal etiology could be contributing to her pain including costochondritis or muscle strain.  I recommended NSAIDs, ice, recheck in the primary care setting.  Close return precautions discussed prior to discharge.    Diagnosis:    ICD-10-CM    1. Chest pain, unspecified type R07.9      Disposition:   The patient is discharged to home.     Discharge Medications:    No discharge medications.     Scribe Disclosure:  I, Orla Severson, am serving as a scribe at 2:20 PM on 7/11/2019 to document services personally performed by Jayce Pierce MD based on my observations and the provider's statements to me.     [facility] EMERGENCY DEPARTMENT       Jayce Pierce MD  07/11/19 0745

## 2019-07-11 NOTE — ED TRIAGE NOTES
"Shortness of breath for \"months\". Chest pain that started this am after getting out of bed. Pain is middle sternum and is \"sharp, achy, (and) heavy\". Nothing she does makes pain better or worse.     Pt is alert and oriented times four. Her skin is warm, dry. Respirations are even and easy. Triage interrupted at this point for EKG.  "

## 2019-08-17 ASSESSMENT — ENCOUNTER SYMPTOMS
DIARRHEA: 0
CONSTIPATION: 0
JOINT SWELLING: 0
COUGH: 0
BREAST MASS: 0
PARESTHESIAS: 0
SHORTNESS OF BREATH: 1
CHILLS: 0
WEAKNESS: 1
MYALGIAS: 1
FREQUENCY: 0
DIZZINESS: 1
PALPITATIONS: 0
NERVOUS/ANXIOUS: 0
FEVER: 0
NAUSEA: 0
ARTHRALGIAS: 0
ABDOMINAL PAIN: 0
SORE THROAT: 0
HEADACHES: 0
HEARTBURN: 0
DYSURIA: 0
HEMATURIA: 0
EYE PAIN: 0
HEMATOCHEZIA: 0

## 2019-08-20 ENCOUNTER — TELEPHONE (OUTPATIENT)
Dept: PEDIATRICS | Facility: CLINIC | Age: 44
End: 2019-08-20

## 2019-08-20 ENCOUNTER — OFFICE VISIT (OUTPATIENT)
Dept: PEDIATRICS | Facility: CLINIC | Age: 44
End: 2019-08-20
Payer: COMMERCIAL

## 2019-08-20 VITALS
SYSTOLIC BLOOD PRESSURE: 124 MMHG | TEMPERATURE: 98.5 F | HEIGHT: 65 IN | OXYGEN SATURATION: 96 % | BODY MASS INDEX: 40.05 KG/M2 | HEART RATE: 90 BPM | DIASTOLIC BLOOD PRESSURE: 78 MMHG | WEIGHT: 240.4 LBS

## 2019-08-20 DIAGNOSIS — R07.9 CHEST PAIN, UNSPECIFIED TYPE: ICD-10-CM

## 2019-08-20 DIAGNOSIS — F33.8 SEASONAL AFFECTIVE DISORDER (H): ICD-10-CM

## 2019-08-20 DIAGNOSIS — I83.90 VARICOSE VEINS OF LOWER EXTREMITY, UNSPECIFIED LATERALITY, UNSPECIFIED WHETHER COMPLICATED: ICD-10-CM

## 2019-08-20 DIAGNOSIS — R53.83 OTHER FATIGUE: ICD-10-CM

## 2019-08-20 DIAGNOSIS — Z00.00 ROUTINE GENERAL MEDICAL EXAMINATION AT A HEALTH CARE FACILITY: Primary | ICD-10-CM

## 2019-08-20 DIAGNOSIS — E66.01 MORBID OBESITY (H): ICD-10-CM

## 2019-08-20 DIAGNOSIS — R06.02 SOB (SHORTNESS OF BREATH): ICD-10-CM

## 2019-08-20 DIAGNOSIS — R63.5 WEIGHT GAIN: ICD-10-CM

## 2019-08-20 PROCEDURE — 99213 OFFICE O/P EST LOW 20 MIN: CPT | Mod: 25 | Performed by: NURSE PRACTITIONER

## 2019-08-20 PROCEDURE — 99396 PREV VISIT EST AGE 40-64: CPT | Performed by: NURSE PRACTITIONER

## 2019-08-20 RX ORDER — BUPROPION HYDROCHLORIDE 150 MG/1
TABLET ORAL
Qty: 166 TABLET | Refills: 0 | Status: SHIPPED | OUTPATIENT
Start: 2019-08-20 | End: 2019-08-26

## 2019-08-20 RX ORDER — BUPROPION HYDROCHLORIDE 150 MG/1
150 TABLET ORAL EVERY MORNING
Qty: 90 TABLET | Refills: 0 | Status: SHIPPED | OUTPATIENT
Start: 2019-08-20 | End: 2019-08-20

## 2019-08-20 RX ORDER — ALBUTEROL SULFATE 90 UG/1
2 AEROSOL, METERED RESPIRATORY (INHALATION) EVERY 4 HOURS PRN
Qty: 1 INHALER | Refills: 0 | Status: SHIPPED | OUTPATIENT
Start: 2019-08-20 | End: 2021-11-16

## 2019-08-20 ASSESSMENT — ENCOUNTER SYMPTOMS
HEARTBURN: 0
DIARRHEA: 0
BREAST MASS: 0
DIZZINESS: 1
ARTHRALGIAS: 0
SHORTNESS OF BREATH: 1
ABDOMINAL PAIN: 0
COUGH: 0
HEMATURIA: 0
JOINT SWELLING: 0
WEAKNESS: 1
PARESTHESIAS: 0
EYE PAIN: 0
MYALGIAS: 1
DYSURIA: 0
HEMATOCHEZIA: 0
HEADACHES: 0
FREQUENCY: 0
SORE THROAT: 0
CONSTIPATION: 0
CHILLS: 0
FEVER: 0
PALPITATIONS: 0
NERVOUS/ANXIOUS: 0
NAUSEA: 0

## 2019-08-20 ASSESSMENT — ANXIETY QUESTIONNAIRES
5. BEING SO RESTLESS THAT IT IS HARD TO SIT STILL: SEVERAL DAYS
1. FEELING NERVOUS, ANXIOUS, OR ON EDGE: SEVERAL DAYS
6. BECOMING EASILY ANNOYED OR IRRITABLE: MORE THAN HALF THE DAYS
GAD7 TOTAL SCORE: 7
2. NOT BEING ABLE TO STOP OR CONTROL WORRYING: SEVERAL DAYS
3. WORRYING TOO MUCH ABOUT DIFFERENT THINGS: SEVERAL DAYS
7. FEELING AFRAID AS IF SOMETHING AWFUL MIGHT HAPPEN: SEVERAL DAYS
IF YOU CHECKED OFF ANY PROBLEMS ON THIS QUESTIONNAIRE, HOW DIFFICULT HAVE THESE PROBLEMS MADE IT FOR YOU TO DO YOUR WORK, TAKE CARE OF THINGS AT HOME, OR GET ALONG WITH OTHER PEOPLE: SOMEWHAT DIFFICULT

## 2019-08-20 ASSESSMENT — PATIENT HEALTH QUESTIONNAIRE - PHQ9
5. POOR APPETITE OR OVEREATING: NOT AT ALL
SUM OF ALL RESPONSES TO PHQ QUESTIONS 1-9: 9

## 2019-08-20 ASSESSMENT — MIFFLIN-ST. JEOR: SCORE: 1741.33

## 2019-08-20 NOTE — PATIENT INSTRUCTIONS
Set up with weight clinic  Wellbutrin may help    Sun lamp  Start Wellbutrin. Start taking 2 tabs after 2 weeks.  See me in 1 month    See vein clinic        Preventive Health Recommendations  Female Ages 40 to 49    Yearly exam:     See your health care provider every year in order to  1. Review health changes.   2. Discuss preventive care.    3. Review your medicines if your doctor prescribed any.      Get a Pap test every three years (unless you have an abnormal result and your provider advises testing more often).      If you get Pap tests with HPV test, you only need to test every 5 years, unless you have an abnormal result. You do not need a Pap test if your uterus was removed (hysterectomy) and you have not had cancer.      You should be tested each year for STDs (sexually transmitted diseases), if you're at risk.     Ask your doctor if you should have a mammogram.      Have a colonoscopy (test for colon cancer) if someone in your family has had colon cancer or polyps before age 50.       Have a cholesterol test every 5 years.       Have a diabetes test (fasting glucose) after age 45. If you are at risk for diabetes, you should have this test every 3 years.    Shots: Get a flu shot each year. Get a tetanus shot every 10 years.     Nutrition:     Eat at least 5 servings of fruits and vegetables each day.    Eat whole-grain bread, whole-wheat pasta and brown rice instead of white grains and rice.    Get adequate Calcium and Vitamin D.      Lifestyle    Exercise at least 150 minutes a week (an average of 30 minutes a day, 5 days a week). This will help you control your weight and prevent disease.    Limit alcohol to one drink per day.    No smoking.     Wear sunscreen to prevent skin cancer.    See your dentist every six months for an exam and cleaning.
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
## 2019-08-20 NOTE — TELEPHONE ENCOUNTER
Bijan from [facility] called asking for clarification on two prescriptions sent by Selena Muller for patient.  Please call pharmacy back at [phone] as soon as able to discuss and advise further.   Thanks.

## 2019-08-21 ENCOUNTER — TELEPHONE (OUTPATIENT)
Dept: PEDIATRICS | Facility: CLINIC | Age: 44
End: 2019-08-21

## 2019-08-21 DIAGNOSIS — F33.8 SEASONAL AFFECTIVE DISORDER (H): Primary | ICD-10-CM

## 2019-08-21 ASSESSMENT — ANXIETY QUESTIONNAIRES: GAD7 TOTAL SCORE: 7

## 2019-08-21 NOTE — TELEPHONE ENCOUNTER
Prior Authorization Retail Medication Request    Medication/Dose: Bupropion 150 ER Tablets    ICD code (if different than what is on RX):  F33.8  Previously Tried and Failed:  Bupropion 150 XL   Rationale:  Seasonal Affective Disorder     Insurance Name:  [employer]   Insurance ID:  [id number]      Pharmacy Information (if different than what is on RX)  Name:  [facility]   Phone:  [phone]  Fax: [phone]

## 2019-08-22 DIAGNOSIS — Z00.00 ROUTINE GENERAL MEDICAL EXAMINATION AT A HEALTH CARE FACILITY: ICD-10-CM

## 2019-08-22 DIAGNOSIS — R53.83 OTHER FATIGUE: ICD-10-CM

## 2019-08-22 DIAGNOSIS — R63.5 WEIGHT GAIN: ICD-10-CM

## 2019-08-22 LAB
BASOPHILS # BLD AUTO: 0 10E9/L (ref 0–0.2)
BASOPHILS NFR BLD AUTO: 0.2 %
CHOLEST SERPL-MCNC: 181 MG/DL
DIFFERENTIAL METHOD BLD: NORMAL
EOSINOPHIL # BLD AUTO: 0.2 10E9/L (ref 0–0.7)
EOSINOPHIL NFR BLD AUTO: 2.8 %
ERYTHROCYTE [DISTWIDTH] IN BLOOD BY AUTOMATED COUNT: 12.9 % (ref 10–15)
GLUCOSE SERPL-MCNC: 92 MG/DL (ref 70–99)
HCT VFR BLD AUTO: 41.3 % (ref 35–47)
HDLC SERPL-MCNC: 47 MG/DL
HGB BLD-MCNC: 13.3 G/DL (ref 11.7–15.7)
HIV 1+2 AB+HIV1 P24 AG SERPL QL IA: NONREACTIVE
LDLC SERPL CALC-MCNC: 100 MG/DL
LYMPHOCYTES # BLD AUTO: 1.1 10E9/L (ref 0.8–5.3)
LYMPHOCYTES NFR BLD AUTO: 14 %
MCH RBC QN AUTO: 31.8 PG (ref 26.5–33)
MCHC RBC AUTO-ENTMCNC: 32.2 G/DL (ref 31.5–36.5)
MCV RBC AUTO: 99 FL (ref 78–100)
MONOCYTES # BLD AUTO: 0.7 10E9/L (ref 0–1.3)
MONOCYTES NFR BLD AUTO: 8.2 %
NEUTROPHILS # BLD AUTO: 6.1 10E9/L (ref 1.6–8.3)
NEUTROPHILS NFR BLD AUTO: 74.8 %
NONHDLC SERPL-MCNC: 134 MG/DL
PLATELET # BLD AUTO: 229 10E9/L (ref 150–450)
RBC # BLD AUTO: 4.18 10E12/L (ref 3.8–5.2)
TRIGL SERPL-MCNC: 169 MG/DL
TSH SERPL DL<=0.005 MIU/L-ACNC: 1.54 MU/L (ref 0.4–4)
WBC # BLD AUTO: 8.1 10E9/L (ref 4–11)

## 2019-08-22 PROCEDURE — 36415 COLL VENOUS BLD VENIPUNCTURE: CPT | Performed by: NURSE PRACTITIONER

## 2019-08-22 PROCEDURE — 80061 LIPID PANEL: CPT | Performed by: NURSE PRACTITIONER

## 2019-08-22 PROCEDURE — 82947 ASSAY GLUCOSE BLOOD QUANT: CPT | Performed by: NURSE PRACTITIONER

## 2019-08-22 PROCEDURE — 85025 COMPLETE CBC W/AUTO DIFF WBC: CPT | Performed by: NURSE PRACTITIONER

## 2019-08-22 PROCEDURE — 84443 ASSAY THYROID STIM HORMONE: CPT | Performed by: NURSE PRACTITIONER

## 2019-08-22 PROCEDURE — 87389 HIV-1 AG W/HIV-1&-2 AB AG IA: CPT | Performed by: NURSE PRACTITIONER

## 2019-08-26 RX ORDER — BUPROPION HYDROCHLORIDE 300 MG/1
300 TABLET ORAL EVERY MORNING
Qty: 90 TABLET | Refills: 3 | Status: SHIPPED | OUTPATIENT
Start: 2019-08-26 | End: 2021-09-16

## 2019-08-26 NOTE — TELEPHONE ENCOUNTER
Ordered the 300mg tabs. Can you see if that's covered? If not, can you find out what the problem is?

## 2019-09-06 ENCOUNTER — OFFICE VISIT (OUTPATIENT)
Dept: ENDOCRINOLOGY | Facility: CLINIC | Age: 44
End: 2019-09-06
Attending: NURSE PRACTITIONER
Payer: COMMERCIAL

## 2019-09-06 VITALS
TEMPERATURE: 98.4 F | HEART RATE: 82 BPM | DIASTOLIC BLOOD PRESSURE: 79 MMHG | HEIGHT: 65 IN | BODY MASS INDEX: 40.54 KG/M2 | SYSTOLIC BLOOD PRESSURE: 129 MMHG | OXYGEN SATURATION: 96 % | WEIGHT: 243.3 LBS

## 2019-09-06 DIAGNOSIS — R63.2 BINGE EATING: ICD-10-CM

## 2019-09-06 DIAGNOSIS — E66.01 MORBID OBESITY (H): Primary | ICD-10-CM

## 2019-09-06 DIAGNOSIS — E66.812 OBESITY, CLASS II, BMI 35-39.9: ICD-10-CM

## 2019-09-06 RX ORDER — NALTREXONE HYDROCHLORIDE 50 MG/1
TABLET, FILM COATED ORAL
Qty: 30 TABLET | Refills: 11 | Status: SHIPPED | OUTPATIENT
Start: 2019-09-06 | End: 2021-11-16

## 2019-09-06 ASSESSMENT — PAIN SCALES - GENERAL: PAINLEVEL: NO PAIN (0)

## 2019-09-06 ASSESSMENT — MIFFLIN-ST. JEOR: SCORE: 1754.48

## 2019-09-06 NOTE — PATIENT INSTRUCTIONS
Food goal: 1200 calorie food plan (based on REE calc of 1,753) using meal replacements, see dietician if not done    Activity goal: begin walking 4 miles daily    Weight goal: to lose 0-2 lbs weekly    Medication goal: to start naltrexone 50 mg taking it 1-2 hrs prior to worst cravings daily    Therapy goal: Psychological Providers    Check with your insurance to see if the psychologist is covered, if not, ask your insurance company for a referral.    [facility]   Martina Camp, PhD, LP   [phone]  Enid Katz, PhD, LP  [phone]  Yaneth Tinsley, PhD                 [phone]    Tj Boyd, Psy,LP             [phone]    [place]  Dawn Rasmussen, PhD, LP  [phone]      [facility]:           [phone]  We send a referral and patient is notified.    [place]  [facility]:  [phone]  Cliff CherryyD, [facility]  Loni Hand PSyD, LP         [phone]    [facility]   To schedule, please call member services on the back of your card.    MAYCO Fernández PsyD,LP,ABPP [phone]    [place]  Glen Sotelo MA, [phone]    [place]  Cliff GarzayD, [phone]    [place]  Cliff BlackburnyD, [phone]  (fibromyalgia issues)    [place] Jolly  [phone]    [place]  Cliff ZarateyD,LP  [phone]  Diogenes Agarwal, PhD, LP  [phone]  Diogenes Cohn, PhD, LP             [phone]    [place]  Cristian Braxton , [facility], LMFT [phone]    [place]  April Salinas, CliffyD, [phone]    [place] Adkins, CliffyD, LP   [phone]     [place]      Outreach [phone]   Krystina Monson MA, LP  - ext. [phone]  Diogenes Denton, PhD, LP - ext [phone]  Adonis Cantrell PsyD, LP - ext [phone]    [place]  Dwain Estrada, PhD,  [phone]  Reno Tapia PsyD,   [phone]          [facility]. [facility] Triplett, PhD,  [phone]    [facility] [place] PsyD,   [phone]            Call updates to MATTHEW Johnson    [phone]  Last updated: 08/16/2019

## 2019-09-06 NOTE — LETTER
"2019       RE: Carri Estrada  [address]  [place] [address]     Dear Colleague,    Thank you for referring your patient, Carri Estrada, to the [facility] MEDICAL WEIGHT MANAGEMENT at [facility]. Please see a copy of my visit note below.    Return Medical Weight Management Note     Carri Estrada  MRN:  [id number]  :  1975  GIANNI:  2019    Dear Colleagues,    I had the pleasure of seeing your patient Carri Estrada.  She is a 44 year old female who I am continuing to see for treatment of obesity related to:       10/12/2018   I have the following co-morbidities associated with obesity: High Cholesterol     INTERVAL HISTORY:  Saw Dr. Ramirez on 10/12/18 (his outstanding note was reviewed) and she is up 11 lbs this interval. She has tried meds in past and most successful was contrave. She got constipated and did not sleep on phentermine. Is already taking wellbutrin now for her SAD and started topiramate on 10/12/18 and stopped it as it was not working.  She has a lot of work stress. 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  We talked about using benadryl 50 mg and melatonin 10 mg 1.5 hours prior to sleep and avoiding alcohol.    CURRENT WEIGHT:   243 lbs 4.8 oz    Wt Readings from Last 4 Encounters:   19 110.4 kg (243 lb 4.8 oz)   19 109 kg (240 lb 6.4 oz)   18 105.1 kg (231 lb 12.8 oz)   11/15/18 104 kg (229 lb 3.2 oz)     Height:  5' 5\"  Body Mass Index:  Body mass index is 40.49 kg/m .  Vitals:  B/P: 129/79, P: 82, R: Data Unavailable     Initial consult weight was 232 on 10/18/18.  Weight change since last seen on Visit date not found is up 11 pounds.   Total gain is 11 pounds.    Diet and Activity Changes Since Last Visit Reviewed With Patient 2019   I have made the following changes to my diet since my last visit: None   With regards to my diet, I am " still struggling with: Variety   I have made the following changes to my activity/exercise since my last visit: None   With regards to my activity/exercise, I am still struggling with: Time and energy       MEDICATIONS:   Current Outpatient Medications   Medication     albuterol (PROAIR HFA/PROVENTIL HFA/VENTOLIN HFA) 108 (90 Base) MCG/ACT inhaler     Aspirin-Acetaminophen-Caffeine (EXCEDRIN PO)     buPROPion (WELLBUTRIN XL) 300 MG 24 hr tablet     cetirizine (ZYRTEC) 2.5 mg half-tab     levonorgestrel (MIRENA) 20 MCG/24HR IUD     No current facility-administered medications for this visit.        Weight Loss Medication History Reviewed With Patient 9/5/2019   Which weight loss medications are you currently taking on a regular basis?  None   If you are not taking a weight loss medication that was prescribed to you, please indicate why: I did not like the side effects, It did not seem to be helping me   Are you having any side effects from the weight loss medication that we have prescribed you? No       ASSESSMENT:   Morbid Obesity, weight regain  Body mass index is 40.49 kg/m .    PLAN:   She is agreeable to starting naltrexone and will take wellbutrin as well, regularly now as Contrave worked well for her in past.  Start naltrexone 50 mg qd. Patient was counseled about risks and side effects. Patient wants to start naltrexone. She is educated on dosage regimen and possible side effects. She was told that naltrexone is not FDA approved for treatment of obesity or binge eating disorder.    Patient Instructions:    Food goal: 1200 calorie food plan (based on REE calc of 1,753) using meal replacements, see dietician if not done    Activity goal: begin walking 4 miles daily    Weight goal: to lose 0-2 lbs weekly    Medication goal: to start naltrexone 50 mg taking it 1-2 hrs prior to worst cravings daily    Therapy goal: Psychological Providers    Check with your insurance to see if the psychologist is covered, if not,  ask your insurance company for a referral.    [facility]   Martina Camp, PhD, LP   [phone]  Enid Katz, PhD, LP  [phone]  Yaneth Tinsley, PhD                 [phone]    Tj Boyd, Psy,LP             [phone]    [place]  Dawn Rasmussen, PhD, LP  [phone]      [facility]:           [phone]  We send a referral and patient is notified.    [place]  [facility]:  [phone]  Camila Gaspar, PsyD, [facility]  Loni Hand, PSyD, LP         [phone]    [facility]   To schedule, please call member services on the back of your card.    MAYCO Fernández PsyD,LP,ABPP [phone]    [place]  Glen Sotelo MA, [phone]    [place]  Angela Keith PsyD, [phone]    [place]  Cliff BlackburnyD, [phone]  (fibromyalgia issues)    [place] Jolly  [phone]    [place]  Nicolasa Maldonado,PsyD,LP  [phone]  Diogenes Agarwal, PhD, LP  [phone]  Diogenes Cohn, PhD, LP             [phone]    [place]  Cristian Braxton , [facility], LMFT [phone]    [place]  April Salinas, PsyD, [phone]    [place] Adkins PsyD, LP   [phone]     [place]      Outreach [phone]   Krystina Monson MA, LP  - ext. [phone]  Diogenes Denton, PhD, LP - ext [phone]  Adonis Cantrell, CliffyD, LP - ext [phone]    [place]  Dwain Estrada, PhD, [phone]  Reno Tapia, PsyD, [phone]          [place]  Migdalia Triplett, PhD, [phone]    [place] PsyD, ROSY  [phone]            Call updates to MATTHEW Johnson    [phone]  Last updated: 2019    FOLLOW-UP:    12 weeks.    Time: 45 min spent on evaluation, management, extensive chart review, counseling, education, & motivational interviewing with greater than 50% of the total time was spent on counseling and coordinating  care    Sincerely,    IDRIS HOLLY MD, MPH  Staff Endocrinologist  [facility]

## 2019-09-06 NOTE — NURSING NOTE
"Chief Complaint   Patient presents with     RECHECK     Follow for weight management.       Vitals:    09/06/19 0803   BP: 129/79   BP Location: Left arm   Patient Position: Sitting   Cuff Size: Adult Large   Pulse: 82   Temp: 98.4  F (36.9  C)   TempSrc: Oral   SpO2: 96%   Weight: 110.4 kg (243 lb 4.8 oz)   Height: 1.651 m (5' 5\")       Body mass index is 40.49 kg/m .                Mariel Wright CMA    "

## 2019-09-06 NOTE — PROGRESS NOTES
"    Return Medical Weight Management Note     Carri Estrada  MRN:  [id number]  :  1975  GIANNI:  2019    Dear Colleagues,    I had the pleasure of seeing your patient Carri Estrada.  She is a 44 year old female who I am continuing to see for treatment of obesity related to:       10/12/2018   I have the following co-morbidities associated with obesity: High Cholesterol     INTERVAL HISTORY:  Saw Dr. Ramirez on 10/12/18 (his outstanding note was reviewed) and she is up 11 lbs this interval. She has tried meds in past and most successful was contrave. She got constipated and did not sleep on phentermine. Is already taking wellbutrin now for her SAD and started topiramate on 10/12/18 and stopped it as it was not working.  She has a lot of work stress. 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  We talked about using benadryl 50 mg and melatonin 10 mg 1.5 hours prior to sleep and avoiding alcohol.    CURRENT WEIGHT:   243 lbs 4.8 oz    Wt Readings from Last 4 Encounters:   19 110.4 kg (243 lb 4.8 oz)   19 109 kg (240 lb 6.4 oz)   18 105.1 kg (231 lb 12.8 oz)   11/15/18 104 kg (229 lb 3.2 oz)     Height:  5' 5\"  Body Mass Index:  Body mass index is 40.49 kg/m .  Vitals:  B/P: 129/79, P: 82, R: Data Unavailable     Initial consult weight was 232 on 10/18/18.  Weight change since last seen on Visit date not found is up 11 pounds.   Total gain is 11 pounds.    Diet and Activity Changes Since Last Visit Reviewed With Patient 2019   I have made the following changes to my diet since my last visit: None   With regards to my diet, I am still struggling with: Variety   I have made the following changes to my activity/exercise since my last visit: None   With regards to my activity/exercise, I am still struggling with: Time and energy       MEDICATIONS:   Current Outpatient Medications   Medication     albuterol (PROAIR HFA/PROVENTIL " HFA/VENTOLIN HFA) 108 (90 Base) MCG/ACT inhaler     Aspirin-Acetaminophen-Caffeine (EXCEDRIN PO)     buPROPion (WELLBUTRIN XL) 300 MG 24 hr tablet     cetirizine (ZYRTEC) 2.5 mg half-tab     levonorgestrel (MIRENA) 20 MCG/24HR IUD     No current facility-administered medications for this visit.        Weight Loss Medication History Reviewed With Patient 9/5/2019   Which weight loss medications are you currently taking on a regular basis?  None   If you are not taking a weight loss medication that was prescribed to you, please indicate why: I did not like the side effects, It did not seem to be helping me   Are you having any side effects from the weight loss medication that we have prescribed you? No       ASSESSMENT:   Morbid Obesity, weight regain  Body mass index is 40.49 kg/m .    PLAN:   She is agreeable to starting naltrexone and will take wellbutrin as well, regularly now as Contrave worked well for her in past.  Start naltrexone 50 mg qd. Patient was counseled about risks and side effects. Patient wants to start naltrexone. She is educated on dosage regimen and possible side effects. She was told that naltrexone is not FDA approved for treatment of obesity or binge eating disorder.    Patient Instructions:    Food goal: 1200 calorie food plan (based on REE calc of 1,753) using meal replacements, see dietician if not done    Activity goal: begin walking 4 miles daily    Weight goal: to lose 0-2 lbs weekly    Medication goal: to start naltrexone 50 mg taking it 1-2 hrs prior to worst cravings daily    Therapy goal: Psychological Providers    Check with your insurance to see if the psychologist is covered, if not, ask your insurance company for a referral.    [facility]   Martina Camp, PhD, LP   [phone]  Enid Katz, PhD, LP  [phone]  Yaneth Tinsley, PhD                 [phone]    [place],             [phone]    [place]  Dawn Rasmussen, PhD,    [phone]      [facility]:           [phone]  We send a referral and patient is notified.    [place]  [facility]:  [phone]  Camila Gaspar PsyD, [facility]  Loni Hand PSyD, LP         [phone]    [facility]   To schedule, please call member services on the back of your card.    MAYCO Fernández PsyD,LP,ABPP [phone]    [place]  Glen Sotelo MA, [phone]    [place]  Angela Keith PsyD, [phone]    [place]  Cliff BlackburnyD, [phone]  (fibromyalgia issues)    [place] Jolly  [phone]    [place]  Nicolasa Maldonado PsyD,LP  [phone]  Diogenes Agarwal, PhD, LP  [phone]  Diogenes Cohn, PhD, LP             [phone]    [place]  Cristian Braxton , [facility], LMFT [phone]    [place]  April Salinas, CliffyD, [phone]    [place] Adkins PsyD, LP   [phone]     [place]      Outreach [phone]   Krystina Monson MA, LP  - ext. [phone]  Diogenes Denton, PhD, LP - ext [phone]  Adonis Cantrell PsyD, LP - ext [phone]    [place]  Dwain Estrada, PhD, [phone]  Reno Tapia PsyD, [phone]          [place]  Migdalia Triplett, PhD, [phone]    [place] PsyD, LP  [phone]            Call updates to MATTHEW Johnson    [phone]  Last updated: 2019    FOLLOW-UP:    12 weeks.    Time: 45 min spent on evaluation, management, extensive chart review, counseling, education, & motivational interviewing with greater than 50% of the total time was spent on counseling and coordinating care    Sincerely,    IDRIS HOLLY MD, MPH  Staff Endocrinologist  [facility]

## 2019-09-23 ENCOUNTER — OFFICE VISIT (OUTPATIENT)
Dept: PEDIATRICS | Facility: CLINIC | Age: 44
End: 2019-09-23
Payer: COMMERCIAL

## 2019-09-23 VITALS
WEIGHT: 236 LBS | OXYGEN SATURATION: 96 % | HEIGHT: 65 IN | HEART RATE: 94 BPM | DIASTOLIC BLOOD PRESSURE: 76 MMHG | SYSTOLIC BLOOD PRESSURE: 118 MMHG | BODY MASS INDEX: 39.32 KG/M2 | TEMPERATURE: 97.8 F

## 2019-09-23 DIAGNOSIS — F41.9 ANXIETY: ICD-10-CM

## 2019-09-23 DIAGNOSIS — F32.A DEPRESSION, UNSPECIFIED DEPRESSION TYPE: Primary | ICD-10-CM

## 2019-09-23 DIAGNOSIS — Z00.00 HEALTHCARE MAINTENANCE: ICD-10-CM

## 2019-09-23 PROCEDURE — 96127 BRIEF EMOTIONAL/BEHAV ASSMT: CPT | Mod: 59 | Performed by: NURSE PRACTITIONER

## 2019-09-23 PROCEDURE — 99214 OFFICE O/P EST MOD 30 MIN: CPT | Performed by: NURSE PRACTITIONER

## 2019-09-23 RX ORDER — FLUOXETINE 10 MG/1
10 TABLET, FILM COATED ORAL DAILY
Qty: 90 TABLET | Refills: 3 | Status: SHIPPED | OUTPATIENT
Start: 2019-09-23 | End: 2020-01-20

## 2019-09-23 ASSESSMENT — PATIENT HEALTH QUESTIONNAIRE - PHQ9
10. IF YOU CHECKED OFF ANY PROBLEMS, HOW DIFFICULT HAVE THESE PROBLEMS MADE IT FOR YOU TO DO YOUR WORK, TAKE CARE OF THINGS AT HOME, OR GET ALONG WITH OTHER PEOPLE: NOT DIFFICULT AT ALL
SUM OF ALL RESPONSES TO PHQ QUESTIONS 1-9: 3
SUM OF ALL RESPONSES TO PHQ QUESTIONS 1-9: 3

## 2019-09-23 ASSESSMENT — ANXIETY QUESTIONNAIRES
GAD7 TOTAL SCORE: 0
5. BEING SO RESTLESS THAT IT IS HARD TO SIT STILL: NOT AT ALL
GAD7 TOTAL SCORE: 0
2. NOT BEING ABLE TO STOP OR CONTROL WORRYING: NOT AT ALL
6. BECOMING EASILY ANNOYED OR IRRITABLE: NOT AT ALL
3. WORRYING TOO MUCH ABOUT DIFFERENT THINGS: NOT AT ALL
7. FEELING AFRAID AS IF SOMETHING AWFUL MIGHT HAPPEN: NOT AT ALL
GAD7 TOTAL SCORE: 0
4. TROUBLE RELAXING: NOT AT ALL
1. FEELING NERVOUS, ANXIOUS, OR ON EDGE: NOT AT ALL
7. FEELING AFRAID AS IF SOMETHING AWFUL MIGHT HAPPEN: NOT AT ALL

## 2019-09-23 ASSESSMENT — MIFFLIN-ST. JEOR: SCORE: 1721.37

## 2019-09-23 NOTE — PROGRESS NOTES
Subjective     Carri Estrada is a 44 year old female who presents to clinic today for the following health issues:    Patient declines flu and tdap vaccine today.      History of Present Illness        Mental Health Follow-up:  Patient presents to follow-up on Depression & Anxiety.Patient's depression since last visit has been:  Better  The patient is not having other symptoms associated with depression.  Patient's anxiety since last visit has been:  Good  The patient is not having other symptoms associated with anxiety.  Any significant life events: No  Patient is not feeling anxious or having panic attacks.  Patient has no concerns about alcohol or drug use.     Social History  Tobacco Use    Smoking status: Former Smoker      Packs/day: 0.50      Years: 35.00      Pack years: 17.5      Types: Cigarettes      Quit date: 2018      Years since quittin.8    Smokeless tobacco: Never Used  Alcohol use: Yes    Frequency: 4 or more times a week    Drinks per session: 1 or 2    Binge frequency: Never    Comment: 5 drinks/week  Drug use: No      Today's PHQ-9         PHQ-9 Total Score:     (P) 3   PHQ-9 Q9 Thoughts of better off dead/self-harm past 2 weeks :   (P) Not at all   Thoughts of suicide or self harm:      Self-harm Plan:        Self-harm Action:          Safety concerns for self or others:               Social History     Tobacco Use     Smoking status: Former Smoker     Packs/day: 0.50     Years: 35.00     Pack years: 17.50     Types: Cigarettes     Last attempt to quit: 2018     Years since quittin.8     Smokeless tobacco: Never Used   Substance Use Topics     Alcohol use: Yes     Frequency: 4 or more times a week     Drinks per session: 1 or 2     Binge frequency: Never     Comment: 5 drinks/week     Drug use: No     PHQ 2019   PHQ-9 Total Score 9 3   Q9: Thoughts of better off dead/self-harm past 2 weeks Not at all Not at all     CLARIBEL-7 SCORE 2019   Total  "Score - 0 (minimal anxiety)   Total Score 7 0     Answers for HPI/ROS submitted by the patient on 9/23/2019   If you checked off any problems, how difficult have these problems made it for you to do your work, take care of things at home, or get along with other people?: Not difficult at all  PHQ9 TOTAL SCORE: 3  CLARIBEL 7 TOTAL SCORE: 0    In the past two weeks have you had thoughts of suicide or self-harm?  No.    Do you have concerns about your personal safety or the safety of others?   No    Suicide Assessment Five-step Evaluation and Treatment (SAFE-T)      How many servings of fruits and vegetables do you eat daily?  4 or more    On average, how many sweetened beverages do you drink each day (soda, juice, sweet tea, etc)?   0    How many days per week do you miss taking your medication? 0    Feeling much better. Has less anhedonia and more energy. Anxiety has increased somewhat. Tried naltrexone for weight and it made her tired. Starting to exercise more.    ROS: const/psych otherwise negative     OBJECTIVE:  /76 (BP Location: Right arm, Cuff Size: Adult Large)   Pulse 94   Temp 97.8  F (36.6  C) (Tympanic)   Ht 1.651 m (5' 5\")   Wt 107 kg (236 lb)   SpO2 96%   BMI 39.27 kg/m    CONSTITUTIONAL: Alert, well-nourished, well-groomed, NAD  PSYCH: Bright affect. Appropriate mentation and speech.       ASSESSMENT/PLAN:  (F32.9) Depression, unspecified depression type  (primary encounter diagnosis)  (F41.9) Anxiety  Comment: Improved with starting Wellbutrin. However, still having depressive sx and anxiety.   Plan:   -Add prozac (weight neutral). If this doesn't help anxiety will switch to lexapro  -Continue wellbutrin  -SAD lamp 20 minutes in the morning  -At least 10 minutes of walking in the afternoon  -Switch B vitamin to afternoon  -Declines therapy  -Vitamin D 2,000 international unit(s)  -Consider addition of fish in the future (currently almost vegan)  -F/U January  -In the mean time if her depression " worsens consider increasing prozac vs Wellbutrin.         Selena Muller, FNP-DNP.

## 2019-09-23 NOTE — PATIENT INSTRUCTIONS
Start Prozac 10  Continue Wellbutrin  Sunlamp 20 minutes in the morning at work ( too)  Vitamin D 2000 international unit(s)  Sauna  Walking 10 minutes daily

## 2019-09-24 ASSESSMENT — ANXIETY QUESTIONNAIRES: GAD7 TOTAL SCORE: 0

## 2019-09-24 ASSESSMENT — PATIENT HEALTH QUESTIONNAIRE - PHQ9: SUM OF ALL RESPONSES TO PHQ QUESTIONS 1-9: 3

## 2019-10-02 ENCOUNTER — HEALTH MAINTENANCE LETTER (OUTPATIENT)
Age: 44
End: 2019-10-02

## 2019-10-16 ENCOUNTER — COMMUNICATION - HEALTHEAST (OUTPATIENT)
Dept: TELEHEALTH | Facility: CLINIC | Age: 44
End: 2019-10-16

## 2019-10-16 ENCOUNTER — OFFICE VISIT - HEALTHEAST (OUTPATIENT)
Dept: VASCULAR SURGERY | Facility: CLINIC | Age: 44
End: 2019-10-16

## 2019-10-16 ENCOUNTER — RECORDS - HEALTHEAST (OUTPATIENT)
Dept: VASCULAR ULTRASOUND | Facility: CLINIC | Age: 44
End: 2019-10-16

## 2019-10-16 DIAGNOSIS — R22.41 LOCALIZED SWELLING OF RIGHT LOWER LEG: ICD-10-CM

## 2019-10-16 DIAGNOSIS — I87.2 VENOUS INSUFFICIENCY OF RIGHT LEG: ICD-10-CM

## 2019-10-16 DIAGNOSIS — R22.41 LOCALIZED SWELLING, MASS AND LUMP, RIGHT LOWER LIMB: ICD-10-CM

## 2019-10-16 DIAGNOSIS — L72.0 CYST OF SKIN AND SUBCUTANEOUS TISSUE: ICD-10-CM

## 2019-10-16 ASSESSMENT — MIFFLIN-ST. JEOR: SCORE: 1706.83

## 2019-12-26 ENCOUNTER — TELEPHONE (OUTPATIENT)
Dept: PEDIATRICS | Facility: CLINIC | Age: 44
End: 2019-12-26

## 2019-12-27 NOTE — TELEPHONE ENCOUNTER
Would pt like to do depression f/u as phone visit instead? If so, pls move 10-15 minutes before the appt after her.

## 2020-01-20 ENCOUNTER — VIRTUAL VISIT (OUTPATIENT)
Dept: PEDIATRICS | Facility: CLINIC | Age: 45
End: 2020-01-20
Payer: COMMERCIAL

## 2020-01-20 DIAGNOSIS — F33.8 SEASONAL DEPRESSION (H): Primary | ICD-10-CM

## 2020-01-20 PROCEDURE — 99441 ZZC PHYSICIAN TELEPHONE EVALUATION 5-10 MIN: CPT | Performed by: NURSE PRACTITIONER

## 2020-01-20 NOTE — PROGRESS NOTES
"Carri Estrada is a 44 year old female who is being evaluated via a telephone visit.      The patient has been notified of following (by MRUBEN)      \"We have found that certain health care needs can be provided without the need for a physical exam.  This service lets us provide the care you need with a short phone conversation.  If a prescription is necessary we can send it directly to your pharmacy.  If lab work is needed we can place an order for that and you can then stop by our lab to have the test done at a later time.    This telephone visit will be conducted via 3 way call with the you (the patient) , the physician/provider, and a me all on the line at the same time.  This allows your physician/provider to have the phone conversation with you while I will be taking notes for your medical record.  We will have full access to your [facility] medical record during this entire phone call.    Since this is like an office visit,  will bill your insurance company for this service.  Please check with your medical insurance if this type of telephone/virtual is covered . You may be responsible for the cost of this service if insurance coverage is denied.  The typical cost is $30 (10min), $59(11-20min) and $85 (21-30min)     If during the course of the call the physician/provider feels a telephone visit is not appropriate, you will not be charged for this service\"    Consent has been obtained for this service by care team member: yes.  See the scanned image in the medical record.    Pertinent parts of the the patient's medical history reviewed and confirmed by the provider included :     (F33.8) Seasonal depression (H)  (primary encounter diagnosis)  Comment: Much improved from last winter, but still only at about 80% of the energy she has in summer. No SI/HI. Doing much better. Didn't tolerate prozac due to anxiety  Plan:   -Start vitamin D 1000 international unit(s) daily  -Continue Wellbutrin. Doesn't want to " increase  -Encouraged going to the gym and exercising or simply using the sauna.          Total time of call between patient and provider was 10 minutes

## 2020-03-10 ENCOUNTER — OFFICE VISIT (OUTPATIENT)
Dept: OBGYN | Facility: CLINIC | Age: 45
End: 2020-03-10
Payer: COMMERCIAL

## 2020-03-10 VITALS — WEIGHT: 247.4 LBS | SYSTOLIC BLOOD PRESSURE: 122 MMHG | DIASTOLIC BLOOD PRESSURE: 84 MMHG | BODY MASS INDEX: 41.17 KG/M2

## 2020-03-10 DIAGNOSIS — L29.2 VULVAR ITCHING: Primary | ICD-10-CM

## 2020-03-10 PROCEDURE — 99213 OFFICE O/P EST LOW 20 MIN: CPT | Mod: 25 | Performed by: OBSTETRICS & GYNECOLOGY

## 2020-03-10 PROCEDURE — 88305 TISSUE EXAM BY PATHOLOGIST: CPT | Performed by: OBSTETRICS & GYNECOLOGY

## 2020-03-10 PROCEDURE — 56605 BIOPSY OF VULVA/PERINEUM: CPT | Performed by: OBSTETRICS & GYNECOLOGY

## 2020-03-10 PROCEDURE — 88312 SPECIAL STAINS GROUP 1: CPT | Performed by: OBSTETRICS & GYNECOLOGY

## 2020-03-10 NOTE — PROGRESS NOTES
SUBJECTIVE:   CC: vulvar lesion/pruritis                                                aCrri Estrada is a 44 year old  female who presents to clinic today for evaluation of persistent patch of irritated skin of the left vulva with intermittent raised lesion she describes as pimple like. Often nicks the skin when shaving, though has tried to avoid shaving lately. Area is constantly pruritic. No new lotions, soaps, detergents. Sleeps without underwear. Has not tried any anti-itch cream. No bleeding from the area, no drainage.     Problem list and histories reviewed & adjusted, as indicated.  Additional history: as documented.    ROS:  Const: no weight changes, fever, chills       albuterol (PROAIR HFA/PROVENTIL HFA/VENTOLIN HFA) 108 (90 Base) MCG/ACT inhaler, Inhale 2 puffs into the lungs every 4 hours as needed for shortness of breath / dyspnea or wheezing  Aspirin-Acetaminophen-Caffeine (EXCEDRIN PO), Take 600 mg by mouth daily  buPROPion (WELLBUTRIN XL) 300 MG 24 hr tablet, Take 1 tablet (300 mg) by mouth every morning  cetirizine (ZYRTEC) 2.5 mg half-tab, Take 2.5 mg by mouth daily  levonorgestrel (MIRENA) 20 MCG/24HR IUD, 1 each (20 mcg) by Intrauterine route continuous  naltrexone (DEPADE/REVIA) 50 MG tablet, Take 1/2 tablet.  Time it one to two hours prior to worst cravings.  Then increase to one full tablet daily as instructed.    No current facility-administered medications on file prior to visit.     Allergies   Allergen Reactions     Foeniculum Vulgare Swelling     No Clinical Screening - See Comments      Other reaction(s): Edema     Tree Nuts [Nuts]        OBJECTIVE:   /84 (BP Location: Left arm, Patient Position: Chair, Cuff Size: Adult Large)   Wt 112.2 kg (247 lb 6.4 oz)   BMI 41.17 kg/m     Const: sitting in chair in no acute distress, comfortable  Pulm: no increased work of breathing, no cough  Psych: mood stable, appropriate affect  Neuro: A+Ox3   : External genitalia normal  well-estrogenized, healthy tissue, normal architecture. Left lateral vulva with raised lesion 1cm x 1cm, mildly erythematous and plaque like.  Bartholins, urethra, normal.  Normal moist pink vaginal mucosa. Physiologic dischrage.       INDICATIONS:                                                    Carri Estrada is a 44 year old female that was found to have a left vulvar lesion.    Is a pregnancy test required: No.  Was a consent obtained?  Yes    PROCEDURE:                                                    The left lateral vulvar area was prepped with Betadine. the area was injected with 1 cc's of 1%  Lidocaine with epinephrine. After adequate anesthesia was reached, the skin was flattened with one hand and a sample of the abnormal area was made with a 2 mm punch biopsy instrument.  The skin disc was elevated and scissors used to cut the underlying tissue.  The specimen was placed in formalin and sent to pathology for evaluation.  Pressure was applied to the biopsy site to control bleeding. Silver nitrate was applied.  Hemostasis was adequate.     POST PROCEDURE:                                                    Carri Estrada is a 44 year old female  here for evaluation of pruritic raised lesion on her lateral left labia majora, concerning for possible lichen given persistence for 1 year and erythematous plaque. We discussed supportive measures for vulvar irritation. Plan over the counter hydrocortisone with sitz baths and trial of vaseline for the time being.     She was observed following the procedure.  She tolerated the procedure well. There were no complications. Patient was discharged in stable condition.    Hot Sitz bath BID, no douching, tampons or intercourse.  Call if bleeding, swelling, pain, redness or fever occur.  Patient will be called with pathology results.    Total time spent outside of procedure was 15 minutes; greater than 50% of time was spent in counseling and/or coordination  of care for the above listed diagnoses.     MD Cristal Lopez MD  Obstetrics and Gynecology   [facility]

## 2020-03-10 NOTE — NURSING NOTE
"Chief Complaint   Patient presents with     Vaginal Problem     patient has had a spot in her groin area that has been itchy for the past year. It has gone away and came back.       Initial /84 (BP Location: Left arm, Patient Position: Chair, Cuff Size: Adult Large)   Wt 112.2 kg (247 lb 6.4 oz)   BMI 41.17 kg/m   Estimated body mass index is 41.17 kg/m  as calculated from the following:    Height as of 19: 1.651 m (5' 5\").    Weight as of this encounter: 112.2 kg (247 lb 6.4 oz).  BP completed using cuff size: large    Questioned patient about current smoking habits.  Pt. quit smoking some time ago.          The following HM Due: NONE    Johnnie Hernandez CMA                 "

## 2020-03-10 NOTE — PATIENT INSTRUCTIONS
Post-biopsy instructions:    You will be contacted with results, generally in 3-5 business days.    Keep biopsy site clean and dry between bathing/bathroom.    Please notify the clinic if you are experiencing any increased pain, swelling, redness, or drainage at the biopsy site(s). Some light bleeding and swelling is normal, especially in the first few days.    You may do sitz baths once or twice daily for comfort by soaking in clean, warm water. Gently pat the area dry. Do not use a hair dryer.     Some Suggested Vulvar Pain & Itching Measures    The vulva is the external genitalia in the female.  The skin of the vulva can be quite sensitive.  Because it is moist and frequently subjected to friction while sitting and moving, this area can be easily injured.  There are various strategies that can be used to prevent irritation and allow the vulva to heal.  Keeping this area dry can accelerate healing.  Chemicals found in toilet tissues, laundry soaps and detergents that come in contact with the vulva can cause irritation.  Avoiding contact with potential irritants that contain chemicals is important.  Fabric softeners in undergarments, chemicals in deodorant soaps, bubble baths, feminine hygiene spray and panty liners, etc., can all cause irritation to the vulva.  The following recommendations are specific measures that can help minimize vulvar irritation.    Wear white 100% cotton underwear and do not wear underwear at night.  Do not wear pantyhose, tights, or other close-fitting clothes.  Enclosing this area with synthetic fibers holds both heat and moisture in the skin, conditions which potentiate the development of infections.  Tight-fitting clothes may also increase your symptoms of discomfort.    After washing underwear, put it through at least one whole cycle with water only.  Some women have suffered needlessly from irritants in detergents whose residue was left in clothes by incomplete rinsing.  Rinsing  clothes thoroughly is more important than which detergent is used although to be on the safe side, the milder the soap, the better.  Wash new underwear before wearing.  Fabric softeners and dryer sheets should not be used.    Soaking in the tub or a sitz bath twice daily for 10-15 minutes can help. After soaking, pat dry the vulva and apply vaseline to the entire outer area.     Rinse skin off with plain water frequently.  Use tap water, distilled water, sitz baths, squirt bottles, or bidets.  Additionally, hand held showers can be helpful for rinsing the vulva.  A wet warm washcloth can be held against the vulva to soak if a bath is unavailable.     Use very mild soap for bathing.  It is best not to use any soaps on the vulva.  The vulva should be rinsed with warm water.  Bars of soap such as Neutrogena unscented face soap, Dove, Basis, Pears (made in [place]), and castile soap with olive oil ([place]) are gentle to the other skin areas.  They are found at pharmacies or health food stores.  Remember that frequent baths with soaps may increase the irritation.  You cannot wash away your symptoms.    Some patients find the use of cool gel packs on the vulva to be helpful.    Do not use products with benzocaine.    Consider using 100% cotton menstrual pads and tampons.  Many women with vulvar pain experience a significant increase in irritation and pain every month when they use commercial paper pads or tampons.  This monthly increase in pain can often be reduced by using 100% washable and reusable cotton menstrual pads.  Pure cotton tampons are available. Or try a menstrual cup.    Don't sit or remain in a wet bathing suit for prolonged periods. Change underwear shortly after exercising as well.

## 2020-03-15 LAB — COPATH REPORT: NORMAL

## 2021-01-15 ENCOUNTER — HEALTH MAINTENANCE LETTER (OUTPATIENT)
Age: 46
End: 2021-01-15

## 2021-06-02 NOTE — PATIENT INSTRUCTIONS - HE
"Procedure: Excision of mass of right thigh    The surgery scheduler Khadijah Charlene at [phone] will contact you to schedule if you were not scheduled today.     You will need a preop physical within 30 days before surgery with your primary care provider. Please note if you do not get a complete History and Physical your surgery will be cancelled.   Please contact your primary to schedule.     A nurse should contact you from the hospital 1-2 days before surgery to review with you.    If you would like a Good Celeste Estimate for your upcoming service/procedure contact Cost of Care Estimates at [phone], advocates are available Monday through Friday 8am - 5pm. You may also submit a request online at [email or web address]    [facility]  [address]  [place]  [address]     [phone] for facility charge    Anesthesiology charge  [phone]      Please don't hesitate to call us with any additional question or problems  Our number is [phone]      We are prescribing some compression stockings for you. I have included different suppliers that should help you get measured and fitting to ensure proper fitting socks. You should wear these socks as much as you can. It is especially important to wear them with long periods of sitting/standing, long car rides or if you will be flying. Compression socks should get replaced every 4-6 months. They do not need to be worn at night while in bed.    If you do a lot of standing it is good to do calf raises to help keep the blood pumping. If you sit a lot at work it is good to get up periodically to walk around. Elevation of the foot of your bed 4-6\" helps the blood return back to where it is needed.    We would like you to follow up in 3 months after wearing compression socks to see how you are doing. Below is a list of locations where you can  the stockings.    Please call one of the locations " below to schedule an appointment to get fitted for your compression stockings. If you received a prescription please bring it with you to your appointment. Some locations are limited to what they carry.    [facility] [place]  [address]   [place] [address]   Phone: [phone]   Fax: [phone]    [facility]    [facility]  [address]  [place] [address]  Phone: [phone]    [facility]   [address] ([facility] [place] [address]  Phone: [phone]    [facility]  [address]   [place] [address]   Phone: [phone]    [facility]  [address]  [place] [address]  Phone: [phone]    [facility]   [address]  [place] [address]  Phone: [phone]    [facility]  [address]  [place] [address]  Phone: [phone]    [facility]  [address]  [place] [address]  Phone: [phone]    [place]   [address] [place] [address]   Phone: [phone]    [facility]   [address]             [address]                 [address]     [place] [address]      [place] [address]         [place] [address]  Phone: [phone]      Phone: [phone]            Phone: [phone]  Fax: [phone]          Fax:[phone]                 Fax: [phone]                                     Clyde Pal  [phone]  [email or web address]

## 2021-06-02 NOTE — PROGRESS NOTES
[facility]    HPI:    44 y.o. year old female who I have been consulted by Selena Muller, CNP for evaluation of Consult (varicose veins)  Swelling on her right medial thigh this happens occasionally does not happen every month and seems to happen cyclically.  She cannot associate for sure exact timing but it seems to come off and on occasionally a drain bloody fluid and/or she thought this was a vein.  Sore tender and painful and this process happens.  She has a lot of chafing in the area and rubbing secondary to her thighs kind of touching and rubbing each other.    Allergies:(d)-limonene flavor; Fennel (foeniculum vulgare); Nuts - unspecified; and Tree nut    Past Medical History:   Diagnosis Date     Adjustment disorder with anxiety 4/30/2013     Bronchitis, acute 10/12/2001     Depression, major, recurrent, moderate (H) 10/28/2014     Dysmenorrhea 11/12/2018     Encounter for IUD insertion 11/15/2018    Patient signed consent form. Lot: [id number] Exp: 04/2021 NDC:69082-938-03     Eustachian tube disorder 12/26/2001     Nicotine abuse 11/12/2018     Obesity 5/6/2014     Pharyngitis, acute 3/2/2001     Seasonal affective disorder (H) 10/28/2014     Tobacco abuse 6/30/2010       Past Surgical History:   Procedure Laterality Date     VAGINA RECONSTRUCTION SURGERY         CURRENT MEDS:  Current Outpatient Medications on File Prior to Visit   Medication Sig Dispense Refill     albuterol (PROAIR HFA;PROVENTIL HFA;VENTOLIN HFA) 90 mcg/actuation inhaler Inhale 2 puffs.       aspirin-acetaminophen-caffeine (EXCEDRIN MIGRAINE) 250-250-65 mg per tablet Take 600 mg by mouth.       buPROPion (WELLBUTRIN XL) 300 MG 24 hr tablet Take 300 mg by mouth.       cetirizine (ZYRTEC) 2.5 mg Take 2.5 mg by mouth.       FLUoxetine (PROZAC) 10 MG tablet Take 10 mg by mouth.       levonorgestrel (MIRENA) 20 mcg/24 hours (5 yrs) 52 mg IUD 20 mcg by Intrauterine route.       naltrexone (DEPADE) 50 mg tablet Take 1/2 tablet.  Time it  "one to two hours prior to worst cravings.  Then increase to one full tablet daily as instructed.       No current facility-administered medications on file prior to visit.        Family History   Problem Relation Age of Onset     Cancer Mother      Heart disease Father      Breast cancer Paternal Grandmother      Alzheimer's disease Paternal Grandmother      Heart disease Paternal Grandfather         reports that she has quit smoking. Her smoking use included cigarettes. She has a 6.75 pack-year smoking history. She has never used smokeless tobacco. She reports current alcohol use. She reports that she does not use drugs.    Review of Systems:  Negative except irritation and drainage from her groin thigh region which is intermittent and happens count of randomly but seems to be kind of almost every month or every other month.    OBJECTIVE:  Vitals:    10/16/19 0828   BP: 130/76   Pulse: 92   Resp: 16   Temp: 98.9  F (37.2  C)   TempSrc: Oral   Weight: (!) 235 lb (106.6 kg)   Height: 5' 5\" (1.651 m)     Body mass index is 39.11 kg/m .    EXAM:  GENERAL: This is a well-developed 44 y.o. female who appears her stated age  HEAD: normocephalic  HEENT: Pupils equal and reactive bilaterally  MOUTH: mucus membranes intact  CARDIAC: RRR without murmur  CHEST/LUNG:  Clear to auscultation  ABDOMEN: Soft, nontender, nondistended, no masses  EXTREMITIES: Grossly normal, warm, she has a healed area in the right medial thigh which at this time point is not raised is not swollen painful or irritation but looks to have an area where punctate used to be a draining at some time point.  NEUROLOGIC: Focally intact, nonfocal  INTEGUMENT: No open lesions or ulcers  VASCULAR: Pulses intact, symmetrical upper and lower extremities.  No varicosities        LABS:  No results found for: WBC, HGB, HCT, MCV, PLT  INR/Prothrombin Time      No results found for: HGBA1C  No results found for: ALT, AST, GGT, ALKPHOS, BILITOT     Images: Limited " study today with an ultrasound to find this is a cystic mass beneath the skin at this area most likely consistent with a cyst which sounds like it occasionally inflames and drains.  This is about 2-1/2 cm under the skin in about 2-1/2 to 3 cm in diameter    Assessment/Plan:     1. Localized swelling of right lower leg  Areas most likely a cyst which intermittently drains in presently is not.  - US Leg Non Vascular Right; Future    2. Cyst of skin and subcutaneous tissue  Ultrasound demonstrates this mass at this time point recommend excision of this and local MAC anesthesia the same day surgery setting.  The risks and benefits were discussed the risk of infection bleeding clotting recurrence and issues were all discussed and she wished to proceed we will set her up whenever she would like to do this.      No follow-ups on file.     Edilson Johnson MD  [facility] Department of Surgery

## 2021-06-02 NOTE — PROGRESS NOTES
This is a new consult for Varicose Veins. The patient has varicose veins that are problematic in right legs. Symptoms patient has been experiencing are heaviness and aching. Patient is main concern if the pulling she feels in the right medial thigh. Patient has not been wearing compression stockings. Patient has not had recent imaging on legs done.

## 2021-06-03 VITALS
DIASTOLIC BLOOD PRESSURE: 76 MMHG | WEIGHT: 235 LBS | HEART RATE: 92 BPM | BODY MASS INDEX: 39.15 KG/M2 | RESPIRATION RATE: 16 BRPM | HEIGHT: 65 IN | SYSTOLIC BLOOD PRESSURE: 130 MMHG | TEMPERATURE: 98.9 F

## 2021-06-19 NOTE — LETTER
Letter by Edilson Johnson MD at      Author: Edilson Johnson MD Service: -- Author Type: --    Filed:  Encounter Date: 10/16/2019 Status: Signed         Selena Muller CNP  [address] [place] [address]                                  October 16, 2019    Patient: Carri Estrada   MR Number: [id number]   YOB: 1975   Date of Visit: 10/16/2019     Dear Dr. Renzo CNP:    Thank you for referring Carri Estrada to me for evaluation. Below are the relevant portions of my assessment and plan of care.    If you have questions, please do not hesitate to call me. I look forward to following Carri along with you.    Sincerely,        Edilson Johnson MD          CC  No Recipients  Edilson Johnson MD  10/16/2019  9:50 AM  Sign when Signing Visit  [facility]    HPI:    44 y.o. year old female who I have been consulted by Selena Muller CNP for evaluation of Consult (varicose veins)  Swelling on her right medial thigh this happens occasionally does not happen every month and seems to happen cyclically.  She cannot associate for sure exact timing but it seems to come off and on occasionally a drain bloody fluid and/or she thought this was a vein.  Sore tender and painful and this process happens.  She has a lot of chafing in the area and rubbing secondary to her thighs kind of touching and rubbing each other.    Allergies:(d)-limonene flavor; Fennel (foeniculum vulgare); Nuts - unspecified; and Tree nut    Past Medical History:   Diagnosis Date   ? Adjustment disorder with anxiety 4/30/2013   ? Bronchitis, acute 10/12/2001   ? Depression, major, recurrent, moderate (H) 10/28/2014   ? Dysmenorrhea 11/12/2018   ? Encounter for IUD insertion 11/15/2018    Patient signed consent form. Lot: [id number] Exp: 04/2021 NDC:27372-489-14   ? Eustachian tube disorder 12/26/2001   ? Nicotine abuse 11/12/2018   ? Obesity 5/6/2014   ? Pharyngitis, acute 3/2/2001   ? Seasonal affective disorder  "(H) 10/28/2014   ? Tobacco abuse 6/30/2010       Past Surgical History:   Procedure Laterality Date   ? VAGINA RECONSTRUCTION SURGERY         CURRENT MEDS:  Current Outpatient Medications on File Prior to Visit   Medication Sig Dispense Refill   ? albuterol (PROAIR HFA;PROVENTIL HFA;VENTOLIN HFA) 90 mcg/actuation inhaler Inhale 2 puffs.     ? aspirin-acetaminophen-caffeine (EXCEDRIN MIGRAINE) 250-250-65 mg per tablet Take 600 mg by mouth.     ? buPROPion (WELLBUTRIN XL) 300 MG 24 hr tablet Take 300 mg by mouth.     ? cetirizine (ZYRTEC) 2.5 mg Take 2.5 mg by mouth.     ? FLUoxetine (PROZAC) 10 MG tablet Take 10 mg by mouth.     ? levonorgestrel (MIRENA) 20 mcg/24 hours (5 yrs) 52 mg IUD 20 mcg by Intrauterine route.     ? naltrexone (DEPADE) 50 mg tablet Take 1/2 tablet.  Time it one to two hours prior to worst cravings.  Then increase to one full tablet daily as instructed.       No current facility-administered medications on file prior to visit.        Family History   Problem Relation Age of Onset   ? Cancer Mother    ? Heart disease Father    ? Breast cancer Paternal Grandmother    ? Alzheimer's disease Paternal Grandmother    ? Heart disease Paternal Grandfather         reports that she has quit smoking. Her smoking use included cigarettes. She has a 6.75 pack-year smoking history. She has never used smokeless tobacco. She reports current alcohol use. She reports that she does not use drugs.    Review of Systems:  Negative except irritation and drainage from her groin thigh region which is intermittent and happens count of randomly but seems to be kind of almost every month or every other month.    OBJECTIVE:  Vitals:    10/16/19 0828   BP: 130/76   Pulse: 92   Resp: 16   Temp: 98.9  F (37.2  C)   TempSrc: Oral   Weight: (!) 235 lb (106.6 kg)   Height: 5' 5\" (1.651 m)     Body mass index is 39.11 kg/m .    EXAM:  GENERAL: This is a well-developed 44 y.o. female who appears her stated age  HEAD: " normocephalic  HEENT: Pupils equal and reactive bilaterally  MOUTH: mucus membranes intact  CARDIAC: RRR without murmur  CHEST/LUNG:  Clear to auscultation  ABDOMEN: Soft, nontender, nondistended, no masses  EXTREMITIES: Grossly normal, warm, she has a healed area in the right medial thigh which at this time point is not raised is not swollen painful or irritation but looks to have an area where punctate used to be a draining at some time point.  NEUROLOGIC: Focally intact, nonfocal  INTEGUMENT: No open lesions or ulcers  VASCULAR: Pulses intact, symmetrical upper and lower extremities.  No varicosities        LABS:  No results found for: WBC, HGB, HCT, MCV, PLT  INR/Prothrombin Time      No results found for: HGBA1C  No results found for: ALT, AST, GGT, ALKPHOS, BILITOT     Images: Limited study today with an ultrasound to find this is a cystic mass beneath the skin at this area most likely consistent with a cyst which sounds like it occasionally inflames and drains.  This is about 2-1/2 cm under the skin in about 2-1/2 to 3 cm in diameter    Assessment/Plan:     1. Localized swelling of right lower leg  Areas most likely a cyst which intermittently drains in presently is not.  - US Leg Non Vascular Right; Future    2. Cyst of skin and subcutaneous tissue  Ultrasound demonstrates this mass at this time point recommend excision of this and local MAC anesthesia the same day surgery setting.  The risks and benefits were discussed the risk of infection bleeding clotting recurrence and issues were all discussed and she wished to proceed we will set her up whenever she would like to do this.      No follow-ups on file.     Edilson Johnson MD  [facility] Department of Surgery

## 2021-09-04 ENCOUNTER — HEALTH MAINTENANCE LETTER (OUTPATIENT)
Age: 46
End: 2021-09-04

## 2021-09-16 ENCOUNTER — MYC MEDICAL ADVICE (OUTPATIENT)
Dept: PEDIATRICS | Facility: CLINIC | Age: 46
End: 2021-09-16

## 2021-09-16 DIAGNOSIS — F32.A DEPRESSION, UNSPECIFIED DEPRESSION TYPE: Primary | ICD-10-CM

## 2021-09-16 RX ORDER — BUPROPION HYDROCHLORIDE 150 MG/1
150 TABLET ORAL EVERY MORNING
Qty: 90 TABLET | Refills: 0 | Status: SHIPPED | OUTPATIENT
Start: 2021-09-16 | End: 2021-11-16

## 2021-09-16 RX ORDER — BUPROPION HYDROCHLORIDE 150 MG/1
150 TABLET ORAL EVERY MORNING
Status: CANCELLED | OUTPATIENT
Start: 2021-09-16

## 2021-09-16 NOTE — TELEPHONE ENCOUNTER
MA/TC: please call pt to assist with making an appt for med changes    Miley Reddy RN on 9/16/2021 at 12:29 PM

## 2021-09-16 NOTE — TELEPHONE ENCOUNTER
Selena,    Please see MC message from pt and advise  Pt is requesting Wellbutrin 150 mg  Med pended- please review    Thank you  Miley Reddy RN on 9/16/2021 at 11:20 AM

## 2021-09-16 NOTE — TELEPHONE ENCOUNTER
Received call from pt regarding her Wellbutrin  Advised to pt that her PCP has sent in a prescription for Wellbutrin 150mg to the [facility]    Pt verbalized understanding     Thank you  Miley Reddy RN on 9/16/2021 at 1:37 PM

## 2021-11-09 SDOH — ECONOMIC STABILITY: INCOME INSECURITY: HOW HARD IS IT FOR YOU TO PAY FOR THE VERY BASICS LIKE FOOD, HOUSING, MEDICAL CARE, AND HEATING?: SOMEWHAT HARD

## 2021-11-09 SDOH — ECONOMIC STABILITY: TRANSPORTATION INSECURITY
IN THE PAST 12 MONTHS, HAS LACK OF TRANSPORTATION KEPT YOU FROM MEETINGS, WORK, OR FROM GETTING THINGS NEEDED FOR DAILY LIVING?: NO

## 2021-11-09 SDOH — ECONOMIC STABILITY: INCOME INSECURITY: IN THE LAST 12 MONTHS, WAS THERE A TIME WHEN YOU WERE NOT ABLE TO PAY THE MORTGAGE OR RENT ON TIME?: YES

## 2021-11-09 SDOH — ECONOMIC STABILITY: FOOD INSECURITY: WITHIN THE PAST 12 MONTHS, YOU WORRIED THAT YOUR FOOD WOULD RUN OUT BEFORE YOU GOT MONEY TO BUY MORE.: SOMETIMES TRUE

## 2021-11-09 SDOH — HEALTH STABILITY: PHYSICAL HEALTH: ON AVERAGE, HOW MANY MINUTES DO YOU ENGAGE IN EXERCISE AT THIS LEVEL?: 0 MIN

## 2021-11-09 SDOH — ECONOMIC STABILITY: FOOD INSECURITY: WITHIN THE PAST 12 MONTHS, THE FOOD YOU BOUGHT JUST DIDN'T LAST AND YOU DIDN'T HAVE MONEY TO GET MORE.: OFTEN TRUE

## 2021-11-09 SDOH — ECONOMIC STABILITY: TRANSPORTATION INSECURITY
IN THE PAST 12 MONTHS, HAS THE LACK OF TRANSPORTATION KEPT YOU FROM MEDICAL APPOINTMENTS OR FROM GETTING MEDICATIONS?: NO

## 2021-11-09 SDOH — HEALTH STABILITY: PHYSICAL HEALTH: ON AVERAGE, HOW MANY DAYS PER WEEK DO YOU ENGAGE IN MODERATE TO STRENUOUS EXERCISE (LIKE A BRISK WALK)?: 0 DAYS

## 2021-11-09 ASSESSMENT — SOCIAL DETERMINANTS OF HEALTH (SDOH)
DO YOU BELONG TO ANY CLUBS OR ORGANIZATIONS SUCH AS CHURCH GROUPS UNIONS, FRATERNAL OR ATHLETIC GROUPS, OR SCHOOL GROUPS?: NO
IN A TYPICAL WEEK, HOW MANY TIMES DO YOU TALK ON THE PHONE WITH FAMILY, FRIENDS, OR NEIGHBORS?: ONCE A WEEK
HOW OFTEN DO YOU ATTEND CHURCH OR RELIGIOUS SERVICES?: NEVER
HOW OFTEN DO YOU GET TOGETHER WITH FRIENDS OR RELATIVES?: NEVER

## 2021-11-09 ASSESSMENT — ENCOUNTER SYMPTOMS
CONSTIPATION: 0
HEADACHES: 0
WEAKNESS: 0
DIZZINESS: 1
CHILLS: 0
JOINT SWELLING: 0
HEMATOCHEZIA: 0
PARESTHESIAS: 1
COUGH: 0
NAUSEA: 0
HEARTBURN: 1
PALPITATIONS: 0
HEMATURIA: 0
SHORTNESS OF BREATH: 0
MYALGIAS: 0
DIARRHEA: 0
SORE THROAT: 0
DYSURIA: 0
ABDOMINAL PAIN: 0
BREAST MASS: 0
FEVER: 0
EYE PAIN: 0
FREQUENCY: 0
NERVOUS/ANXIOUS: 0
ARTHRALGIAS: 0

## 2021-11-09 ASSESSMENT — LIFESTYLE VARIABLES
HOW MANY STANDARD DRINKS CONTAINING ALCOHOL DO YOU HAVE ON A TYPICAL DAY: 1 OR 2
HOW OFTEN DO YOU HAVE SIX OR MORE DRINKS ON ONE OCCASION: NEVER
HOW OFTEN DO YOU HAVE A DRINK CONTAINING ALCOHOL: 4 OR MORE TIMES A WEEK

## 2021-11-16 ENCOUNTER — OFFICE VISIT (OUTPATIENT)
Dept: PEDIATRICS | Facility: CLINIC | Age: 46
End: 2021-11-16
Payer: COMMERCIAL

## 2021-11-16 ENCOUNTER — TELEPHONE (OUTPATIENT)
Dept: PEDIATRICS | Facility: CLINIC | Age: 46
End: 2021-11-16

## 2021-11-16 VITALS
DIASTOLIC BLOOD PRESSURE: 74 MMHG | TEMPERATURE: 98.9 F | HEART RATE: 84 BPM | OXYGEN SATURATION: 98 % | SYSTOLIC BLOOD PRESSURE: 116 MMHG | BODY MASS INDEX: 41.25 KG/M2 | RESPIRATION RATE: 14 BRPM | WEIGHT: 247.6 LBS | HEIGHT: 65 IN

## 2021-11-16 DIAGNOSIS — F32.A DEPRESSION, UNSPECIFIED DEPRESSION TYPE: ICD-10-CM

## 2021-11-16 DIAGNOSIS — J45.30 MILD PERSISTENT ASTHMA WITHOUT COMPLICATION: ICD-10-CM

## 2021-11-16 DIAGNOSIS — Z78.9 ALCOHOL USE: ICD-10-CM

## 2021-11-16 DIAGNOSIS — Z00.00 HEALTHCARE MAINTENANCE: Primary | ICD-10-CM

## 2021-11-16 LAB
ALBUMIN SERPL-MCNC: 3.5 G/DL (ref 3.4–5)
ALP SERPL-CCNC: 61 U/L (ref 40–150)
ALT SERPL W P-5'-P-CCNC: 22 U/L (ref 0–50)
AST SERPL W P-5'-P-CCNC: 8 U/L (ref 0–45)
BILIRUB DIRECT SERPL-MCNC: <0.1 MG/DL (ref 0–0.2)
BILIRUB SERPL-MCNC: 0.2 MG/DL (ref 0.2–1.3)
CHOLEST SERPL-MCNC: 178 MG/DL
FASTING STATUS PATIENT QL REPORTED: YES
FASTING STATUS PATIENT QL REPORTED: YES
GLUCOSE BLD-MCNC: 88 MG/DL (ref 70–99)
HDLC SERPL-MCNC: 45 MG/DL
LDLC SERPL CALC-MCNC: 108 MG/DL
NONHDLC SERPL-MCNC: 133 MG/DL
PROT SERPL-MCNC: 7.1 G/DL (ref 6.8–8.8)
TRIGL SERPL-MCNC: 125 MG/DL

## 2021-11-16 PROCEDURE — 99396 PREV VISIT EST AGE 40-64: CPT | Performed by: NURSE PRACTITIONER

## 2021-11-16 PROCEDURE — 36415 COLL VENOUS BLD VENIPUNCTURE: CPT | Performed by: NURSE PRACTITIONER

## 2021-11-16 PROCEDURE — 80061 LIPID PANEL: CPT | Performed by: NURSE PRACTITIONER

## 2021-11-16 PROCEDURE — 80076 HEPATIC FUNCTION PANEL: CPT | Performed by: NURSE PRACTITIONER

## 2021-11-16 PROCEDURE — 82947 ASSAY GLUCOSE BLOOD QUANT: CPT | Performed by: NURSE PRACTITIONER

## 2021-11-16 RX ORDER — ALBUTEROL SULFATE 90 UG/1
2 AEROSOL, METERED RESPIRATORY (INHALATION) EVERY 4 HOURS PRN
Qty: 16 G | Refills: 11 | Status: SHIPPED | OUTPATIENT
Start: 2021-11-16 | End: 2023-07-11

## 2021-11-16 RX ORDER — BUPROPION HYDROCHLORIDE 150 MG/1
150 TABLET ORAL EVERY MORNING
Qty: 90 TABLET | Refills: 3 | Status: SHIPPED | OUTPATIENT
Start: 2021-11-16 | End: 2022-08-17

## 2021-11-16 RX ORDER — FLUTICASONE PROPIONATE 110 UG/1
1 AEROSOL, METERED RESPIRATORY (INHALATION) 2 TIMES DAILY
Qty: 12 G | Refills: 11 | Status: SHIPPED | OUTPATIENT
Start: 2021-11-16 | End: 2023-07-11

## 2021-11-16 ASSESSMENT — ENCOUNTER SYMPTOMS
DIARRHEA: 0
PALPITATIONS: 0
NERVOUS/ANXIOUS: 0
DIZZINESS: 1
FEVER: 0
CONSTIPATION: 0
CHILLS: 0
JOINT SWELLING: 0
NAUSEA: 0
BREAST MASS: 0
EYE PAIN: 0
HEMATOCHEZIA: 0
HEMATURIA: 0
PARESTHESIAS: 1
COUGH: 0
FREQUENCY: 0
ABDOMINAL PAIN: 0
HEARTBURN: 1
WEAKNESS: 0
MYALGIAS: 0
SHORTNESS OF BREATH: 0
HEADACHES: 0
SORE THROAT: 0
DYSURIA: 0
ARTHRALGIAS: 0

## 2021-11-16 ASSESSMENT — MIFFLIN-ST. JEOR: SCORE: 1763.99

## 2021-11-16 ASSESSMENT — PATIENT HEALTH QUESTIONNAIRE - PHQ9
SUM OF ALL RESPONSES TO PHQ QUESTIONS 1-9: 5
10. IF YOU CHECKED OFF ANY PROBLEMS, HOW DIFFICULT HAVE THESE PROBLEMS MADE IT FOR YOU TO DO YOUR WORK, TAKE CARE OF THINGS AT HOME, OR GET ALONG WITH OTHER PEOPLE: NOT DIFFICULT AT ALL
SUM OF ALL RESPONSES TO PHQ QUESTIONS 1-9: 5
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
## 2021-11-17 ASSESSMENT — PATIENT HEALTH QUESTIONNAIRE - PHQ9: SUM OF ALL RESPONSES TO PHQ QUESTIONS 1-9: 5

## 2021-11-17 ASSESSMENT — ASTHMA QUESTIONNAIRES: ACT_TOTALSCORE: 18

## 2022-01-19 ENCOUNTER — ANCILLARY PROCEDURE (OUTPATIENT)
Dept: MAMMOGRAPHY | Facility: CLINIC | Age: 47
End: 2022-01-19
Payer: COMMERCIAL

## 2022-01-19 DIAGNOSIS — Z00.00 HEALTHCARE MAINTENANCE: ICD-10-CM

## 2022-01-19 PROCEDURE — 77067 SCR MAMMO BI INCL CAD: CPT | Mod: TC | Performed by: RADIOLOGY

## 2022-01-27 ENCOUNTER — ANCILLARY PROCEDURE (OUTPATIENT)
Dept: ULTRASOUND IMAGING | Facility: CLINIC | Age: 47
End: 2022-01-27
Attending: OBSTETRICS & GYNECOLOGY
Payer: COMMERCIAL

## 2022-01-27 DIAGNOSIS — Z30.431 IUD CHECK UP: ICD-10-CM

## 2022-01-27 PROCEDURE — 76856 US EXAM PELVIC COMPLETE: CPT | Performed by: OBSTETRICS & GYNECOLOGY

## 2022-01-27 PROCEDURE — 76830 TRANSVAGINAL US NON-OB: CPT | Performed by: OBSTETRICS & GYNECOLOGY

## 2022-03-01 ENCOUNTER — TRANSFERRED RECORDS (OUTPATIENT)
Dept: HEALTH INFORMATION MANAGEMENT | Facility: CLINIC | Age: 47
End: 2022-03-01
Payer: COMMERCIAL

## 2022-05-11 ENCOUNTER — TELEPHONE (OUTPATIENT)
Dept: PEDIATRICS | Facility: CLINIC | Age: 47
End: 2022-05-11

## 2022-05-11 NOTE — TELEPHONE ENCOUNTER
Patient Quality Outreach    Patient is due for the following:   Asthma  -  ACT needed    NEXT STEPS:   No follow up needed at this time.    Type of outreach:    Sent Wave - Private Location App message.    Next Steps:  Reach out within 90 days via Phone.    Max number of attempts reached: No. Will try again in 90 days if patient still on fail list.    Questions for provider review:    None     Shirley Simpson CMA  Chart routed to self.

## 2022-08-17 ENCOUNTER — LAB (OUTPATIENT)
Dept: LAB | Facility: CLINIC | Age: 47
End: 2022-08-17

## 2022-08-17 ENCOUNTER — E-VISIT (OUTPATIENT)
Dept: PEDIATRICS | Facility: CLINIC | Age: 47
End: 2022-08-17
Payer: COMMERCIAL

## 2022-08-17 DIAGNOSIS — R53.83 OTHER FATIGUE: ICD-10-CM

## 2022-08-17 DIAGNOSIS — R53.83 OTHER FATIGUE: Primary | ICD-10-CM

## 2022-08-17 LAB
BASOPHILS # BLD AUTO: 0 10E3/UL (ref 0–0.2)
BASOPHILS NFR BLD AUTO: 0 %
EOSINOPHIL # BLD AUTO: 0.2 10E3/UL (ref 0–0.7)
EOSINOPHIL NFR BLD AUTO: 2 %
ERYTHROCYTE [DISTWIDTH] IN BLOOD BY AUTOMATED COUNT: 13.1 % (ref 10–15)
HCT VFR BLD AUTO: 43.9 % (ref 35–47)
HGB BLD-MCNC: 14.4 G/DL (ref 11.7–15.7)
LYMPHOCYTES # BLD AUTO: 2.3 10E3/UL (ref 0.8–5.3)
LYMPHOCYTES NFR BLD AUTO: 21 %
MCH RBC QN AUTO: 31.8 PG (ref 26.5–33)
MCHC RBC AUTO-ENTMCNC: 32.8 G/DL (ref 31.5–36.5)
MCV RBC AUTO: 97 FL (ref 78–100)
MONOCYTES # BLD AUTO: 1 10E3/UL (ref 0–1.3)
MONOCYTES NFR BLD AUTO: 9 %
NEUTROPHILS # BLD AUTO: 7.5 10E3/UL (ref 1.6–8.3)
NEUTROPHILS NFR BLD AUTO: 69 %
PLATELET # BLD AUTO: 278 10E3/UL (ref 150–450)
RBC # BLD AUTO: 4.53 10E6/UL (ref 3.8–5.2)
WBC # BLD AUTO: 11 10E3/UL (ref 4–11)

## 2022-08-17 PROCEDURE — 84443 ASSAY THYROID STIM HORMONE: CPT

## 2022-08-17 PROCEDURE — 99421 OL DIG E/M SVC 5-10 MIN: CPT | Performed by: NURSE PRACTITIONER

## 2022-08-17 PROCEDURE — 82728 ASSAY OF FERRITIN: CPT

## 2022-08-17 PROCEDURE — 36415 COLL VENOUS BLD VENIPUNCTURE: CPT

## 2022-08-17 PROCEDURE — 85025 COMPLETE CBC W/AUTO DIFF WBC: CPT

## 2022-08-17 PROCEDURE — 82306 VITAMIN D 25 HYDROXY: CPT

## 2022-08-17 PROCEDURE — 82607 VITAMIN B-12: CPT

## 2022-08-18 LAB
DEPRECATED CALCIDIOL+CALCIFEROL SERPL-MC: 34 UG/L (ref 20–75)
FERRITIN SERPL-MCNC: 95 NG/ML (ref 8–252)
TSH SERPL DL<=0.005 MIU/L-ACNC: 1.25 MU/L (ref 0.4–4)
VIT B12 SERPL-MCNC: 903 PG/ML (ref 232–1245)

## 2022-08-22 NOTE — TELEPHONE ENCOUNTER
Patient Quality Outreach    Patient is due for the following:   Asthma  -  ACT needed    Next Steps:   Complete ACT    Type of outreach:    Spoke with patient, she will complete the ACT in the next few days.    Next Steps:  Reach out within 90 days via NA.    Max number of attempts reached: No. Will try again in 90 days if patient still on fail list.    Questions for provider review:    None     Shirley Simpson, [facility]  Chart routed to self.

## 2022-09-22 NOTE — TELEPHONE ENCOUNTER
Patient Quality Outreach    Patient is due for the following:   Asthma  -  ACT needed    Next Steps:   Complete ACT    Type of outreach:    Sent Verivue message.    Next Steps:  Reach out within 90 days via Letter.    Max number of attempts reached: No. Will try again in 90 days if patient still on fail list.    Questions for provider review:    None     Shirley Simpson CMA  Chart routed to self.

## 2022-09-26 ENCOUNTER — TELEPHONE (OUTPATIENT)
Dept: PEDIATRICS | Facility: CLINIC | Age: 47
End: 2022-09-26

## 2022-09-26 NOTE — LETTER
October 3, 2022      Carri Estrada  [address]    [place] [address]        Dear Carri,       We care about your health and have reviewed your health plan including your medical conditions, medications, and lab results.  Based on this review, it is recommended that you follow up regarding the following health topic(s):  -Colon Cancer Screening  -Cervical Cancer Screening  -Wellness (Physical) Visit     We recommend you take the following action(s):  -schedule a WELLNESS (Physical) APPOINTMENT.  We will perform the following labs: pap smear.     Please call us at the [facility] - [phone] (or use [employer]) to address the above recommendations. Please call now as your provider is booking into late November 2022.    Thank you for trusting [facility] and we appreciate the opportunity to serve you.  We look forward to supporting your healthcare needs in the future.    Healthy Regards,    Your Health Care Team  [facility]

## 2022-10-03 NOTE — TELEPHONE ENCOUNTER
Patient Quality Outreach    Patient is due for the following:   Colon Cancer Screening  Cervical Cancer Screening - PAP Needed  Physical  - see below    Next Steps:   Schedule a Adult Preventative    Type of outreach:    Sent letter.    Next Steps:  Reach out within 90 days via Phone.    Max number of attempts reached: Yes. Will try again in 90 days if patient still on fail list.    Questions for provider review:    None     Camila Abebe MA  Chart closed.

## 2022-10-22 ENCOUNTER — HEALTH MAINTENANCE LETTER (OUTPATIENT)
Age: 47
End: 2022-10-22

## 2023-01-02 NOTE — TELEPHONE ENCOUNTER
Patient Quality Outreach    Patient is due for the following:   Asthma  -  ACT needed    Next Steps:   None, patient completed on 9-26-22.    Type of outreach:    NA    Next Steps:  Reach out within 90 days via NA.    Max number of attempts reached: NA. Will try again in 90 days if patient still on fail list.    Questions for provider review:    BARRY Simpson CMA  Chart routed to BARRY.

## 2023-04-01 ENCOUNTER — HEALTH MAINTENANCE LETTER (OUTPATIENT)
Age: 48
End: 2023-04-01

## 2023-04-06 ENCOUNTER — TELEPHONE (OUTPATIENT)
Dept: PEDIATRICS | Facility: CLINIC | Age: 48
End: 2023-04-06
Payer: COMMERCIAL

## 2023-04-06 ENCOUNTER — MYC MEDICAL ADVICE (OUTPATIENT)
Dept: PEDIATRICS | Facility: CLINIC | Age: 48
End: 2023-04-06
Payer: COMMERCIAL

## 2023-04-06 ASSESSMENT — ASTHMA QUESTIONNAIRES
QUESTION_5 LAST FOUR WEEKS HOW WOULD YOU RATE YOUR ASTHMA CONTROL: COMPLETELY CONTROLLED
QUESTION_1 LAST FOUR WEEKS HOW MUCH OF THE TIME DID YOUR ASTHMA KEEP YOU FROM GETTING AS MUCH DONE AT WORK, SCHOOL OR AT HOME: NONE OF THE TIME
QUESTION_3 LAST FOUR WEEKS HOW OFTEN DID YOUR ASTHMA SYMPTOMS (WHEEZING, COUGHING, SHORTNESS OF BREATH, CHEST TIGHTNESS OR PAIN) WAKE YOU UP AT NIGHT OR EARLIER THAN USUAL IN THE MORNING: ONCE OR TWICE
QUESTION_2 LAST FOUR WEEKS HOW OFTEN HAVE YOU HAD SHORTNESS OF BREATH: ONCE OR TWICE A WEEK
ACT_TOTALSCORE: 22
ACT_TOTALSCORE: 22
QUESTION_4 LAST FOUR WEEKS HOW OFTEN HAVE YOU USED YOUR RESCUE INHALER OR NEBULIZER MEDICATION (SUCH AS ALBUTEROL): ONCE A WEEK OR LESS

## 2023-04-06 NOTE — TELEPHONE ENCOUNTER
Patient Quality Outreach    Patient is due for the following:   Asthma  -  ACT needed and AAP  Colon Cancer Screening  Breast Cancer Screening - Mammogram  Cervical Cancer Screening - PAP Needed  Depression  -  PHQ-9 needed  Physical Preventive Adult Physical,  - Due after 11/16/22      Topic Date Due     Hepatitis B Vaccine (1 of 3 - 3-dose series) Never done     Diptheria Tetanus Pertussis (DTAP/TDAP/TD) Vaccine (1 - Tdap) 01/02/2005     COVID-19 Vaccine (3 - Booster for Moderna series) 04/23/2021     Flu Vaccine (1) 09/01/2022     Next Steps:   Schedule a Adult Preventative    Type of outreach:    Sent [employer] message.+ ACT    Questions for provider review:    None     Camila Abebe MA  Chart routed to Care Team.

## 2023-07-05 SDOH — ECONOMIC STABILITY: INCOME INSECURITY: IN THE LAST 12 MONTHS, WAS THERE A TIME WHEN YOU WERE NOT ABLE TO PAY THE MORTGAGE OR RENT ON TIME?: YES

## 2023-07-05 SDOH — HEALTH STABILITY: PHYSICAL HEALTH: ON AVERAGE, HOW MANY DAYS PER WEEK DO YOU ENGAGE IN MODERATE TO STRENUOUS EXERCISE (LIKE A BRISK WALK)?: 1 DAY

## 2023-07-05 SDOH — ECONOMIC STABILITY: FOOD INSECURITY: WITHIN THE PAST 12 MONTHS, THE FOOD YOU BOUGHT JUST DIDN'T LAST AND YOU DIDN'T HAVE MONEY TO GET MORE.: SOMETIMES TRUE

## 2023-07-05 SDOH — ECONOMIC STABILITY: FOOD INSECURITY: WITHIN THE PAST 12 MONTHS, YOU WORRIED THAT YOUR FOOD WOULD RUN OUT BEFORE YOU GOT MONEY TO BUY MORE.: SOMETIMES TRUE

## 2023-07-05 SDOH — ECONOMIC STABILITY: INCOME INSECURITY: HOW HARD IS IT FOR YOU TO PAY FOR THE VERY BASICS LIKE FOOD, HOUSING, MEDICAL CARE, AND HEATING?: HARD

## 2023-07-05 SDOH — HEALTH STABILITY: PHYSICAL HEALTH: ON AVERAGE, HOW MANY MINUTES DO YOU ENGAGE IN EXERCISE AT THIS LEVEL?: 30 MIN

## 2023-07-05 ASSESSMENT — LIFESTYLE VARIABLES
AUDIT-C TOTAL SCORE: 3
HOW OFTEN DO YOU HAVE A DRINK CONTAINING ALCOHOL: 2-3 TIMES A WEEK
HOW MANY STANDARD DRINKS CONTAINING ALCOHOL DO YOU HAVE ON A TYPICAL DAY: 1 OR 2
SKIP TO QUESTIONS 9-10: 1
HOW OFTEN DO YOU HAVE SIX OR MORE DRINKS ON ONE OCCASION: NEVER

## 2023-07-05 ASSESSMENT — ENCOUNTER SYMPTOMS
HEMATOCHEZIA: 1
FEVER: 0
BREAST MASS: 0
DIZZINESS: 0
NERVOUS/ANXIOUS: 1
DYSURIA: 0
SORE THROAT: 0
JOINT SWELLING: 0
PARESTHESIAS: 0
ABDOMINAL PAIN: 0
ARTHRALGIAS: 1
CHILLS: 0
NAUSEA: 0
HEMATURIA: 0
DIARRHEA: 0
HEADACHES: 1
COUGH: 0
SHORTNESS OF BREATH: 0
WEAKNESS: 1
CONSTIPATION: 0
PALPITATIONS: 1
FREQUENCY: 0
EYE PAIN: 0
MYALGIAS: 1
HEARTBURN: 1

## 2023-07-05 ASSESSMENT — SOCIAL DETERMINANTS OF HEALTH (SDOH)
HOW OFTEN DO YOU GET TOGETHER WITH FRIENDS OR RELATIVES?: NEVER
DO YOU BELONG TO ANY CLUBS OR ORGANIZATIONS SUCH AS CHURCH GROUPS UNIONS, FRATERNAL OR ATHLETIC GROUPS, OR SCHOOL GROUPS?: NO
IN A TYPICAL WEEK, HOW MANY TIMES DO YOU TALK ON THE PHONE WITH FAMILY, FRIENDS, OR NEIGHBORS?: ONCE A WEEK
HOW OFTEN DO YOU ATTEND CHURCH OR RELIGIOUS SERVICES?: NEVER

## 2023-07-11 ENCOUNTER — OFFICE VISIT (OUTPATIENT)
Dept: PEDIATRICS | Facility: CLINIC | Age: 48
End: 2023-07-11
Payer: COMMERCIAL

## 2023-07-11 VITALS
SYSTOLIC BLOOD PRESSURE: 135 MMHG | HEIGHT: 65 IN | OXYGEN SATURATION: 97 % | TEMPERATURE: 98.3 F | RESPIRATION RATE: 20 BRPM | WEIGHT: 234.4 LBS | DIASTOLIC BLOOD PRESSURE: 80 MMHG | BODY MASS INDEX: 39.05 KG/M2 | HEART RATE: 91 BPM

## 2023-07-11 DIAGNOSIS — E78.5 HYPERLIPIDEMIA LDL GOAL <100: ICD-10-CM

## 2023-07-11 DIAGNOSIS — F33.8 SEASONAL AFFECTIVE DISORDER (H): ICD-10-CM

## 2023-07-11 DIAGNOSIS — R12 HEARTBURN: ICD-10-CM

## 2023-07-11 DIAGNOSIS — J45.30 MILD PERSISTENT ASTHMA WITHOUT COMPLICATION: ICD-10-CM

## 2023-07-11 DIAGNOSIS — Z12.11 SCREEN FOR COLON CANCER: ICD-10-CM

## 2023-07-11 DIAGNOSIS — N95.2 ATROPHIC VAGINITIS: ICD-10-CM

## 2023-07-11 DIAGNOSIS — E66.01 MORBID OBESITY (H): ICD-10-CM

## 2023-07-11 DIAGNOSIS — Z00.00 HEALTHCARE MAINTENANCE: Primary | ICD-10-CM

## 2023-07-11 DIAGNOSIS — F41.9 ANXIETY: ICD-10-CM

## 2023-07-11 DIAGNOSIS — Z12.4 CERVICAL CANCER SCREENING: ICD-10-CM

## 2023-07-11 PROCEDURE — 87624 HPV HI-RISK TYP POOLED RSLT: CPT | Performed by: NURSE PRACTITIONER

## 2023-07-11 PROCEDURE — 99396 PREV VISIT EST AGE 40-64: CPT | Mod: 25 | Performed by: NURSE PRACTITIONER

## 2023-07-11 PROCEDURE — G0145 SCR C/V CYTO,THINLAYER,RESCR: HCPCS | Performed by: NURSE PRACTITIONER

## 2023-07-11 PROCEDURE — 99214 OFFICE O/P EST MOD 30 MIN: CPT | Mod: 25 | Performed by: NURSE PRACTITIONER

## 2023-07-11 PROCEDURE — 90715 TDAP VACCINE 7 YRS/> IM: CPT | Performed by: NURSE PRACTITIONER

## 2023-07-11 PROCEDURE — 90471 IMMUNIZATION ADMIN: CPT | Performed by: NURSE PRACTITIONER

## 2023-07-11 RX ORDER — ESTRADIOL 0.1 MG/G
CREAM VAGINAL
Qty: 36 G | Refills: 0 | Status: SHIPPED | OUTPATIENT
Start: 2023-07-11 | End: 2023-08-11

## 2023-07-11 RX ORDER — ALBUTEROL SULFATE 90 UG/1
2 AEROSOL, METERED RESPIRATORY (INHALATION) EVERY 4 HOURS PRN
Qty: 16 G | Refills: 11 | Status: SHIPPED | OUTPATIENT
Start: 2023-07-11

## 2023-07-11 ASSESSMENT — ENCOUNTER SYMPTOMS
FREQUENCY: 0
MYALGIAS: 1
HEMATURIA: 0
CONSTIPATION: 0
ABDOMINAL PAIN: 0
DIZZINESS: 0
NERVOUS/ANXIOUS: 1
HEMATOCHEZIA: 1
JOINT SWELLING: 0
BREAST MASS: 0
SORE THROAT: 0
DYSURIA: 0
HEADACHES: 1
PARESTHESIAS: 0
EYE PAIN: 0
DIARRHEA: 0
WEAKNESS: 1
HEARTBURN: 1
SHORTNESS OF BREATH: 0
COUGH: 0
PALPITATIONS: 1
NAUSEA: 0
CHILLS: 0
ARTHRALGIAS: 1
FEVER: 0

## 2023-07-11 ASSESSMENT — PATIENT HEALTH QUESTIONNAIRE - PHQ9
10. IF YOU CHECKED OFF ANY PROBLEMS, HOW DIFFICULT HAVE THESE PROBLEMS MADE IT FOR YOU TO DO YOUR WORK, TAKE CARE OF THINGS AT HOME, OR GET ALONG WITH OTHER PEOPLE: NOT DIFFICULT AT ALL
SUM OF ALL RESPONSES TO PHQ QUESTIONS 1-9: 5
SUM OF ALL RESPONSES TO PHQ QUESTIONS 1-9: 5

## 2023-07-11 ASSESSMENT — PAIN SCALES - GENERAL: PAINLEVEL: NO PAIN (0)

## 2023-07-12 PROBLEM — E78.5 HYPERLIPIDEMIA LDL GOAL <100: Status: ACTIVE | Noted: 2023-07-12

## 2023-07-14 LAB
BKR LAB AP GYN ADEQUACY: NORMAL
BKR LAB AP GYN INTERPRETATION: NORMAL
BKR LAB AP HPV REFLEX: NORMAL
BKR LAB AP PREVIOUS ABNORMAL: NORMAL
PATH REPORT.COMMENTS IMP SPEC: NORMAL
PATH REPORT.COMMENTS IMP SPEC: NORMAL
PATH REPORT.RELEVANT HX SPEC: NORMAL

## 2023-07-17 LAB
HUMAN PAPILLOMA VIRUS 16 DNA: NEGATIVE
HUMAN PAPILLOMA VIRUS 18 DNA: NEGATIVE
HUMAN PAPILLOMA VIRUS FINAL DIAGNOSIS: NORMAL
HUMAN PAPILLOMA VIRUS OTHER HR: NEGATIVE

## 2023-07-20 ENCOUNTER — ANCILLARY PROCEDURE (OUTPATIENT)
Dept: MAMMOGRAPHY | Facility: CLINIC | Age: 48
End: 2023-07-20
Attending: NURSE PRACTITIONER
Payer: COMMERCIAL

## 2023-07-20 DIAGNOSIS — Z12.31 VISIT FOR SCREENING MAMMOGRAM: ICD-10-CM

## 2023-07-20 PROCEDURE — 77067 SCR MAMMO BI INCL CAD: CPT | Mod: TC | Performed by: RADIOLOGY

## 2023-07-20 PROCEDURE — 77063 BREAST TOMOSYNTHESIS BI: CPT | Mod: TC | Performed by: RADIOLOGY

## 2023-11-08 ENCOUNTER — HOSPITAL ENCOUNTER (EMERGENCY)
Facility: CLINIC | Age: 48
Discharge: HOME | End: 2023-11-08
Attending: EMERGENCY MEDICINE | Admitting: EMERGENCY MEDICINE
Payer: COMMERCIAL

## 2023-11-08 ENCOUNTER — TELEPHONE (OUTPATIENT)
Dept: PEDIATRICS | Facility: CLINIC | Age: 48
End: 2023-11-08
Payer: COMMERCIAL

## 2023-11-08 ENCOUNTER — NURSE TRIAGE (OUTPATIENT)
Dept: PEDIATRICS | Facility: CLINIC | Age: 48
End: 2023-11-08
Payer: COMMERCIAL

## 2023-11-08 ENCOUNTER — APPOINTMENT (OUTPATIENT)
Dept: CT IMAGING | Facility: CLINIC | Age: 48
End: 2023-11-08
Attending: EMERGENCY MEDICINE
Payer: COMMERCIAL

## 2023-11-08 ENCOUNTER — APPOINTMENT (OUTPATIENT)
Dept: ULTRASOUND IMAGING | Facility: CLINIC | Age: 48
End: 2023-11-08
Attending: EMERGENCY MEDICINE
Payer: COMMERCIAL

## 2023-11-08 VITALS
TEMPERATURE: 99 F | DIASTOLIC BLOOD PRESSURE: 80 MMHG | SYSTOLIC BLOOD PRESSURE: 130 MMHG | RESPIRATION RATE: 19 BRPM | OXYGEN SATURATION: 98 % | BODY MASS INDEX: 39.67 KG/M2 | HEIGHT: 65 IN | HEART RATE: 98 BPM | WEIGHT: 238.1 LBS

## 2023-11-08 DIAGNOSIS — R10.13 EPIGASTRIC PAIN: ICD-10-CM

## 2023-11-08 DIAGNOSIS — D72.829 LEUKOCYTOSIS, UNSPECIFIED TYPE: ICD-10-CM

## 2023-11-08 LAB
ALBUMIN SERPL BCG-MCNC: 4.3 G/DL (ref 3.5–5.2)
ALBUMIN UR-MCNC: NEGATIVE MG/DL
ALP SERPL-CCNC: 68 U/L (ref 35–104)
ALT SERPL W P-5'-P-CCNC: 10 U/L (ref 0–50)
ANION GAP SERPL CALCULATED.3IONS-SCNC: 9 MMOL/L (ref 7–15)
APPEARANCE UR: CLEAR
AST SERPL W P-5'-P-CCNC: 16 U/L (ref 0–45)
BASOPHILS # BLD AUTO: 0 10E3/UL (ref 0–0.2)
BASOPHILS NFR BLD AUTO: 0 %
BILIRUB SERPL-MCNC: 0.3 MG/DL
BILIRUB UR QL STRIP: NEGATIVE
BUN SERPL-MCNC: 10 MG/DL (ref 6–20)
CALCIUM SERPL-MCNC: 9.4 MG/DL (ref 8.6–10)
CHLORIDE SERPL-SCNC: 102 MMOL/L (ref 98–107)
COLOR UR AUTO: ABNORMAL
CREAT SERPL-MCNC: 0.69 MG/DL (ref 0.51–0.95)
DEPRECATED HCO3 PLAS-SCNC: 25 MMOL/L (ref 22–29)
EGFRCR SERPLBLD CKD-EPI 2021: >90 ML/MIN/1.73M2
EOSINOPHIL # BLD AUTO: 0.9 10E3/UL (ref 0–0.7)
EOSINOPHIL NFR BLD AUTO: 7 %
ERYTHROCYTE [DISTWIDTH] IN BLOOD BY AUTOMATED COUNT: 12.3 % (ref 10–15)
GLUCOSE SERPL-MCNC: 84 MG/DL (ref 70–99)
GLUCOSE UR STRIP-MCNC: NEGATIVE MG/DL
HCG SERPL QL: NEGATIVE
HCT VFR BLD AUTO: 41.9 % (ref 35–47)
HGB BLD-MCNC: 14 G/DL (ref 11.7–15.7)
HGB UR QL STRIP: ABNORMAL
IMM GRANULOCYTES # BLD: 0.1 10E3/UL
IMM GRANULOCYTES NFR BLD: 1 %
KETONES UR STRIP-MCNC: NEGATIVE MG/DL
LEUKOCYTE ESTERASE UR QL STRIP: NEGATIVE
LIPASE SERPL-CCNC: 59 U/L (ref 13–60)
LYMPHOCYTES # BLD AUTO: 1.4 10E3/UL (ref 0.8–5.3)
LYMPHOCYTES NFR BLD AUTO: 11 %
MCH RBC QN AUTO: 31.2 PG (ref 26.5–33)
MCHC RBC AUTO-ENTMCNC: 33.4 G/DL (ref 31.5–36.5)
MCV RBC AUTO: 93 FL (ref 78–100)
MONOCYTES # BLD AUTO: 0.9 10E3/UL (ref 0–1.3)
MONOCYTES NFR BLD AUTO: 7 %
NEUTROPHILS # BLD AUTO: 9.8 10E3/UL (ref 1.6–8.3)
NEUTROPHILS NFR BLD AUTO: 74 %
NITRATE UR QL: NEGATIVE
NRBC # BLD AUTO: 0 10E3/UL
NRBC BLD AUTO-RTO: 0 /100
PH UR STRIP: 6.5 [PH] (ref 5–7)
PLATELET # BLD AUTO: 238 10E3/UL (ref 150–450)
POTASSIUM SERPL-SCNC: 4 MMOL/L (ref 3.4–5.3)
PROT SERPL-MCNC: 7.5 G/DL (ref 6.4–8.3)
RBC # BLD AUTO: 4.49 10E6/UL (ref 3.8–5.2)
RBC URINE: 2 /HPF
SODIUM SERPL-SCNC: 136 MMOL/L (ref 135–145)
SP GR UR STRIP: 1.01 (ref 1–1.03)
SQUAMOUS EPITHELIAL: 2 /HPF
UROBILINOGEN UR STRIP-MCNC: NORMAL MG/DL
WBC # BLD AUTO: 13.1 10E3/UL (ref 4–11)
WBC URINE: <1 /HPF

## 2023-11-08 PROCEDURE — 74177 CT ABD & PELVIS W/CONTRAST: CPT

## 2023-11-08 PROCEDURE — 80053 COMPREHEN METABOLIC PANEL: CPT | Performed by: EMERGENCY MEDICINE

## 2023-11-08 PROCEDURE — 84703 CHORIONIC GONADOTROPIN ASSAY: CPT | Performed by: EMERGENCY MEDICINE

## 2023-11-08 PROCEDURE — 83690 ASSAY OF LIPASE: CPT | Performed by: EMERGENCY MEDICINE

## 2023-11-08 PROCEDURE — 99285 EMERGENCY DEPT VISIT HI MDM: CPT | Mod: 25

## 2023-11-08 PROCEDURE — 250N000011 HC RX IP 250 OP 636: Performed by: EMERGENCY MEDICINE

## 2023-11-08 PROCEDURE — 81003 URINALYSIS AUTO W/O SCOPE: CPT | Performed by: EMERGENCY MEDICINE

## 2023-11-08 PROCEDURE — 36415 COLL VENOUS BLD VENIPUNCTURE: CPT | Performed by: EMERGENCY MEDICINE

## 2023-11-08 PROCEDURE — 250N000009 HC RX 250: Performed by: EMERGENCY MEDICINE

## 2023-11-08 PROCEDURE — 85014 HEMATOCRIT: CPT | Performed by: EMERGENCY MEDICINE

## 2023-11-08 PROCEDURE — 76705 ECHO EXAM OF ABDOMEN: CPT

## 2023-11-08 RX ORDER — IOPAMIDOL 755 MG/ML
500 INJECTION, SOLUTION INTRAVASCULAR ONCE
Status: COMPLETED | OUTPATIENT
Start: 2023-11-08 | End: 2023-11-08

## 2023-11-08 RX ORDER — ONDANSETRON 4 MG/1
4 TABLET, ORALLY DISINTEGRATING ORAL EVERY 8 HOURS PRN
Qty: 12 TABLET | Refills: 0 | Status: SHIPPED | OUTPATIENT
Start: 2023-11-08 | End: 2024-09-09

## 2023-11-08 RX ADMIN — SODIUM CHLORIDE 65 ML: 9 INJECTION, SOLUTION INTRAVENOUS at 15:45

## 2023-11-08 RX ADMIN — IOPAMIDOL 100 ML: 755 INJECTION, SOLUTION INTRAVENOUS at 15:45

## 2023-11-08 ASSESSMENT — ACTIVITIES OF DAILY LIVING (ADL)
ADLS_ACUITY_SCORE: 35
ADLS_ACUITY_SCORE: 35

## 2023-11-08 NOTE — ED PROVIDER NOTES
"    History     Chief Complaint:  Abdominal Pain       HPI   Carri Estrada is a 48 year old female who is otherwise healthy and presents with nausea and diarrhea for the past 5 days and upper abdominal pain for the past 3 days.  The pain is constant but worsens after eating.  She also spiked a low-grade fever in the past couple of days.  She is not taking thing for the pain.  She has never had pain like this in the past.  She denies any urinary symptoms.  She denies any abdominal surgeries in the past.      Independent Historian:    None-patient only    Review of External Notes:  Reviewed nurse triage note from this morning with recommendation go to the ER.      Past Medical History:    Past Medical History:   Diagnosis Date    Depressive disorder     Uncomplicated asthma when i was young       Past Surgical History:    Past Surgical History:   Procedure Laterality Date    VAGINA RECONSTRUCTION SURGERY            Physical Exam   Patient Vitals for the past 24 hrs:   BP Temp Temp src Pulse Resp SpO2 Height Weight   11/08/23 1650 130/80 -- -- 98 19 98 % -- --   11/08/23 1135 132/83 99  F (37.2  C) Temporal 104 18 98 % 1.651 m (5' 5\") 108 kg (238 lb 1.6 oz)        Physical Exam  Vitals and nursing note reviewed.   Constitutional:       General: She is not in acute distress.     Appearance: She is not ill-appearing or toxic-appearing.   HENT:      Head: Normocephalic and atraumatic.      Right Ear: External ear normal.      Left Ear: External ear normal.      Nose: Nose normal.   Eyes:      Extraocular Movements: Extraocular movements intact.      Conjunctiva/sclera: Conjunctivae normal.   Cardiovascular:      Rate and Rhythm: Normal rate and regular rhythm.      Heart sounds: No murmur heard.  Pulmonary:      Effort: Pulmonary effort is normal. No respiratory distress.      Breath sounds: Normal breath sounds. No wheezing, rhonchi or rales.   Abdominal:      General: Abdomen is flat. Bowel sounds are normal. There " is no distension.      Palpations: Abdomen is soft.      Tenderness: There is abdominal tenderness in the epigastric area. There is guarding. There is no rebound.   Musculoskeletal:         General: No deformity or signs of injury.      Cervical back: Normal range of motion and neck supple.   Skin:     General: Skin is warm and dry.      Findings: No rash.   Neurological:      Mental Status: She is alert and oriented to person, place, and time.   Psychiatric:         Behavior: Behavior normal.           Emergency Department Course     Imaging:  CT Abdomen Pelvis w Contrast   Final Result   IMPRESSION:    1.  Possible changes of mild acute interstitial pancreatitis,   recommend correlation with lipase.   2.  No additional visualized explanation for patient's symptoms.      DAMIAN JAY MD            SYSTEM ID:  [id number]      US Abdomen Limited (RUQ)   Final Result   IMPRESSION:   1.  Normal gallbladder. No biliary ductal dilatation.   2.  Ill-defined echogenicity in the expected areas of the pancreas is   indeterminate. Consider a follow-up CT of the abdomen and pelvis for   further evaluation.      ДМИТРИЙ BALL MD            SYSTEM ID:  [id number]        Report per radiology    Laboratory:  Labs Ordered and Resulted from Time of ED Arrival to Time of ED Departure   ROUTINE UA WITH MICROSCOPIC REFLEX TO CULTURE - Abnormal       Result Value    Color Urine Light Yellow      Appearance Urine Clear      Glucose Urine Negative      Bilirubin Urine Negative      Ketones Urine Negative      Specific Gravity Urine 1.013      Blood Urine Trace (*)     pH Urine 6.5      Protein Albumin Urine Negative      Urobilinogen Urine Normal      Nitrite Urine Negative      Leukocyte Esterase Urine Negative      RBC Urine 2      WBC Urine <1      Squamous Epithelials Urine 2 (*)    CBC WITH PLATELETS AND DIFFERENTIAL - Abnormal    WBC Count 13.1 (*)     RBC Count 4.49      Hemoglobin 14.0      Hematocrit 41.9      MCV 93      MCH  31.2      MCHC 33.4      RDW 12.3      Platelet Count 238      % Neutrophils 74      % Lymphocytes 11      % Monocytes 7      % Eosinophils 7      % Basophils 0      % Immature Granulocytes 1      NRBCs per 100 WBC 0      Absolute Neutrophils 9.8 (*)     Absolute Lymphocytes 1.4      Absolute Monocytes 0.9      Absolute Eosinophils 0.9 (*)     Absolute Basophils 0.0      Absolute Immature Granulocytes 0.1      Absolute NRBCs 0.0     COMPREHENSIVE METABOLIC PANEL - Normal    Sodium 136      Potassium 4.0      Carbon Dioxide (CO2) 25      Anion Gap 9      Urea Nitrogen 10.0      Creatinine 0.69      GFR Estimate >90      Calcium 9.4      Chloride 102      Glucose 84      Alkaline Phosphatase 68      AST 16      ALT 10      Protein Total 7.5      Albumin 4.3      Bilirubin Total 0.3     LIPASE - Normal    Lipase 59     HCG QUALITATIVE PREGNANCY - Normal    hCG Serum Qualitative Negative            Emergency Department Course & Assessments:             Interventions:  Medications   iopamidol (ISOVUE-370) solution 500 mL (100 mLs Intravenous $Given 23 1545)   CT scan flush (65 mLs Intravenous $Given 23 1545)        Independent Interpretation (X-rays, CTs, rhythm strip):  None    Consultations/Discussion of Management or Tests:  None       Social Determinants of Health affecting care:  None     Disposition:  The patient was discharged to home.     Impression & Plan    CMS Diagnoses: None    Medical Decision Makin-year-old female with upper abdominal pain, nausea, diarrhea.  She is quite tender in the epigastrium.  I suspect she may have cholecystitis.  Other possibilities include choledocholithiasis, hepatitis, pancreatitis, gastritis.  We will check labs and start with ultrasound.  Patient declines any pain medication.    1640 rechecked patient updated on results.  Her labs are significant for a mild leukocytosis but otherwise unremarkable.  Her ultrasound was negative for cholecystitis or  choledocholithiasis.  Given the white count and her pain, a CT was then performed to rule out other etiologies.  There was some suggestion of possible mild interstitial pancreatitis, however lipase is normal.  It is possible that she could have very mild pancreatitis or gastritis or peptic ulcer.  I updated the patient on results and she continues to decline any pain medications.  She is currently on famotidine at baseline but I recommend she try a PPI and use Tums or Maalox as needed.  I will prescribe Zofran as well for nausea.  Recommend avoiding alcohol and minimizing NSAIDs for the immediate future.  We discussed return precautions and I recommended close follow-up with her primary care physician given that the diagnosis at this point is still somewhat unclear.      Diagnosis:    ICD-10-CM    1. Epigastric pain  R10.13       2. Leukocytosis, unspecified type  D72.829            Discharge Medications:  Discharge Medication List as of 11/8/2023  4:45 PM        START taking these medications    Details   ondansetron (ZOFRAN ODT) 4 MG ODT tab Take 1 tablet (4 mg) by mouth every 8 hours as needed for nausea, Disp-12 tablet, R-0, E-Prescribe              11/8/2023   James Luna MD Goodwin, Shaun M, MD  11/08/23 2247

## 2023-11-08 NOTE — ED TRIAGE NOTES
Pt arrives ambulatory with  for upper abdominal pain that has been ongoing for 5 days and is worsening. Fever began yesterday and had severe body aches and has had watery diarrhea for 5 days. Took excedrin 8am. Reports mild nausea but has been drinking a lot of water and no vomiting. No history of abdominal surgery or major medical issues.

## 2023-11-08 NOTE — TELEPHONE ENCOUNTER
Upper abominal pain x5 days.  New onset temp this morning of 100.0  Rates pain a 7-8/10  Diarrhea x5 days.  Tolerating fluid intake but pain seems to be getting worse.  Advised ER, patient agreeable to plan    Terri Kruger RN, BSN    Reason for Disposition   MILD TO MODERATE constant pain lasting > 2 hours    Additional Information   Negative: SEVERE difficulty breathing (e.g., struggling for each breath, speaks in single words)   Negative: Shock suspected (e.g., cold/pale/clammy skin, too weak to stand, low BP, rapid pulse)   Negative: Difficult to awaken or acting confused (e.g., disoriented, slurred speech)   Negative: Passed out (i.e., lost consciousness, collapsed and was not responding)   Negative: Visible sweat on face or sweat is dripping down   Negative: Sounds like a life-threatening emergency to the triager   Negative: Followed an abdomen (stomach) injury   Negative: Chest pain   Negative: Abdominal pain and pregnant < 20 weeks   Negative: Abdominal pain and pregnant 20 or more weeks   Negative: Abdomen bloating or swelling are main symptoms   Negative: SEVERE abdominal pain (e.g., excruciating)   Negative: Pain lasting > 10 minutes and over 50 years old   Negative: Pain lasting > 10 minutes and over 40 years old and associated chest, arm, neck, upper back, or jaw pain   Negative: Pain lasting > 10 minutes and over 35 years old and at least one cardiac risk factor (e.g., diabetes, high cholesterol, hypertension, obesity, smoker or strong family history of heart disease)   Negative: Pain lasting > 10 minutes and history of heart disease (i.e., heart attack, bypass surgery, angina, angioplasty, CHF)   Negative: Pain lasts > 10 minutes and difficulty breathing   Negative: Vomiting red blood or black (coffee ground) material  (Exception: Few streaks and only occurred once.)   Negative: Blood in bowel movements  (Exception: Blood on surface of BM with constipation.)   Negative: Black or tarry bowel movements   (Exception: Chronic-unchanged black-grey BMs AND is taking iron pills or Pepto-Bismol.)   Negative: Pregnant 20 weeks or more and new hand or face swelling    Protocols used: Abdominal Pain - Upper-A-OH

## 2024-03-13 ENCOUNTER — E-VISIT (OUTPATIENT)
Dept: PEDIATRICS | Facility: CLINIC | Age: 49
End: 2024-03-13
Payer: COMMERCIAL

## 2024-03-13 DIAGNOSIS — Z86.69 HISTORY OF MIGRAINE: Primary | ICD-10-CM

## 2024-03-13 PROCEDURE — 99421 OL DIG E/M SVC 5-10 MIN: CPT | Performed by: NURSE PRACTITIONER

## 2024-03-13 RX ORDER — SUMATRIPTAN 50 MG/1
50 TABLET, FILM COATED ORAL
Qty: 9 TABLET | Refills: 0 | Status: SHIPPED | OUTPATIENT
Start: 2024-03-13 | End: 2024-05-06

## 2024-05-03 ENCOUNTER — OFFICE VISIT (OUTPATIENT)
Dept: OBGYN | Facility: CLINIC | Age: 49
End: 2024-05-03
Payer: COMMERCIAL

## 2024-05-03 VITALS
SYSTOLIC BLOOD PRESSURE: 128 MMHG | HEIGHT: 65 IN | WEIGHT: 245 LBS | BODY MASS INDEX: 40.82 KG/M2 | DIASTOLIC BLOOD PRESSURE: 78 MMHG

## 2024-05-03 DIAGNOSIS — R10.2 PELVIC PAIN IN FEMALE: Primary | ICD-10-CM

## 2024-05-03 DIAGNOSIS — Z97.5 IUD (INTRAUTERINE DEVICE) IN PLACE: ICD-10-CM

## 2024-05-03 PROCEDURE — 99203 OFFICE O/P NEW LOW 30 MIN: CPT | Performed by: OBSTETRICS & GYNECOLOGY

## 2024-05-03 NOTE — PROGRESS NOTES
"  Assessment & Plan     Pelvic pain in female  - New onset yesterday  - No obvious cause for pelvic discomfort.  - US Pelvic Complete with Transvaginal; Future  - If IUD is in proper position and no pelvic anatomic lesions, Pt to consider follow-up w/ PCP, look for non-Gyn causes    IUD (intrauterine device) in place  - Plan as above  - IUD strings protrude typical amount from cervical os. No visible evidence of partial expulsion or displacement.  - US Pelvic Complete with Transvaginal; Future    Review of the result(s) of each unique test - Abd U/S 11/8/2023 as noted below, CT Abs/Pelvis 11/8/2023  - IUD in proper position        BMI  Estimated body mass index is 40.77 kg/m  as calculated from the following:    Height as of this encounter: 1.651 m (5' 5\").    Weight as of this encounter: 111.1 kg (245 lb).             No follow-ups on file.    Carol Christina is a 49 year old, presenting for the following health issues:  Vaginal Problem (Feeling poking and prolapsed cervix )    Pt here for a sensation of something falling out, fearing her Mirena IUD is coming out. She started feeling this yesterday. She had the Mirena placed 11/15/2018. She had been amenorrheic from it for years, but in the last year or so she has had some spotting on occasion. She did a digital exam and felt the strings and wondered if the strings felt easier to reach, making her fear the IUD is being expelled. Back in 11/2023 a CT Abd/Pelvis showed the IUD was in the correct position, and Pt had no sx's like current then.                       Objective    /78 (BP Location: Right arm, Patient Position: Sitting, Cuff Size: Adult Regular)   Ht 1.651 m (5' 5\")   Wt 111.1 kg (245 lb)   BMI 40.77 kg/m    Body mass index is 40.77 kg/m .  Physical Exam   Pelvic- Exam chaperoned by nurse, External genitalia normal, Bartholin's glands normal, Falfurrias's glands normal, Urethral meatus normal, Urethra normal, Bladder normal, Vagina with normal " rugae, no abnormal lesions, no abnormal discharge, Normal cervix without lesions or mucopus, no cervical motion tenderness, 2 IUD strings were visualized protruding 2 cm outside external os, Uterus normal size, shape, and contour, no masses, non-tender, Adnexa normal size without masses or tenderness bilaterally, Anus normal, Pelvic exam limited by obesity      Rads 11/8/2023:  Results for orders placed or performed during the hospital encounter of 11/08/23   US Abdomen Limited (RUQ)    Narrative    US ABDOMEN LIMITED 11/8/2023 2:58 PM    CLINICAL HISTORY: upper abd pain, fever, nausea  TECHNIQUE: Limited abdominal ultrasound.    COMPARISON: None.    FINDINGS:    GALLBLADDER: The gallbladder is normal. No gallstones, wall  thickening, or pericholecystic fluid. Negative sonographic Santiago's  sign.    BILE DUCTS: There is no biliary dilatation. The common duct measures  4mm.    LIVER: Normal.    RIGHT KIDNEY: No hydronephrosis.    PANCREAS: Ill-defined echogenicity in the expected area of the  pancreas.      Impression    IMPRESSION:  1.  Normal gallbladder. No biliary ductal dilatation.  2.  Ill-defined echogenicity in the expected areas of the pancreas is  indeterminate. Consider a follow-up CT of the abdomen and pelvis for  further evaluation.    ДМИТРИЙ BALL MD         SYSTEM ID:  [id number]   CT Abdomen Pelvis w Contrast    Narrative    CT ABDOMEN PELVIS W CONTRAST 11/8/2023 3:54 PM    CLINICAL HISTORY: abd pain, fever, nausea, diarrhea, leukocytosis    TECHNIQUE: CT scan of the abdomen and pelvis was performed following  injection of IV contrast. Multiplanar reformats were obtained. Dose  reduction techniques were used.  CONTRAST: 100mL Isovue-370    COMPARISON: Same day ultrasound    FINDINGS:   LOWER CHEST: Unremarkable.    HEPATOBILIARY: Normal.    PANCREAS: Possible mild fat stranding adjacent to the pancreas. No  ductal dilation or parenchymal hypoenhancement.    SPLEEN: Normal.    ADRENAL GLANDS:  Normal.    KIDNEYS/BLADDER: No hydronephrosis. Urinary bladder is decompressed  but otherwise unremarkable.    BOWEL: No obstruction or inflammatory change. Normal appendix.    PELVIC ORGANS: IUD in appropriate position.    ADDITIONAL FINDINGS: None.    MUSCULOSKELETAL: No acute bony abnormality.      Impression    IMPRESSION:   1.  Possible changes of mild acute interstitial pancreatitis,  recommend correlation with lipase.  2.  No additional visualized explanation for patient's symptoms.    DAMIAN JAY MD         SYSTEM ID:  [id number]             Signed Electronically by: Sandeep Parra MD

## 2024-05-03 NOTE — NURSING NOTE
"Chief Complaint   Patient presents with    Vaginal Problem     Feeling poking and prolapsed cervix        Initial /78 (BP Location: Right arm, Patient Position: Sitting, Cuff Size: Adult Regular)   Ht 1.651 m (5' 5\")   Wt 111.1 kg (245 lb)   BMI 40.77 kg/m   Estimated body mass index is 40.77 kg/m  as calculated from the following:    Height as of this encounter: 1.651 m (5' 5\").    Weight as of this encounter: 111.1 kg (245 lb).  BP completed using cuff size: large    Questioned patient about current smoking habits.  Pt. has never smoked.          The following HM Due: NONE    Vinicio Abebe CMA                "

## 2024-05-06 ENCOUNTER — MYC REFILL (OUTPATIENT)
Dept: PEDIATRICS | Facility: CLINIC | Age: 49
End: 2024-05-06
Payer: COMMERCIAL

## 2024-05-06 DIAGNOSIS — Z86.69 HISTORY OF MIGRAINE: ICD-10-CM

## 2024-05-06 RX ORDER — SUMATRIPTAN 50 MG/1
50 TABLET, FILM COATED ORAL
Qty: 18 TABLET | Refills: 3 | Status: SHIPPED | OUTPATIENT
Start: 2024-05-06 | End: 2024-09-09

## 2024-05-06 NOTE — TELEPHONE ENCOUNTER
Filled with refills and a higher quantity. Does she want to follow-up to discuss migraine prevention? If so, please schedule

## 2024-05-16 ENCOUNTER — E-VISIT (OUTPATIENT)
Dept: PEDIATRICS | Facility: CLINIC | Age: 49
End: 2024-05-16
Payer: COMMERCIAL

## 2024-05-16 DIAGNOSIS — E66.01 MORBID OBESITY (H): Primary | ICD-10-CM

## 2024-05-16 PROCEDURE — 99207 PR NO CHARGE LOS: CPT | Performed by: NURSE PRACTITIONER

## 2024-06-22 ASSESSMENT — ASTHMA QUESTIONNAIRES
QUESTION_5 LAST FOUR WEEKS HOW WOULD YOU RATE YOUR ASTHMA CONTROL: COMPLETELY CONTROLLED
ACT_TOTALSCORE: 24
QUESTION_4 LAST FOUR WEEKS HOW OFTEN HAVE YOU USED YOUR RESCUE INHALER OR NEBULIZER MEDICATION (SUCH AS ALBUTEROL): ONCE A WEEK OR LESS
QUESTION_2 LAST FOUR WEEKS HOW OFTEN HAVE YOU HAD SHORTNESS OF BREATH: NOT AT ALL
QUESTION_3 LAST FOUR WEEKS HOW OFTEN DID YOUR ASTHMA SYMPTOMS (WHEEZING, COUGHING, SHORTNESS OF BREATH, CHEST TIGHTNESS OR PAIN) WAKE YOU UP AT NIGHT OR EARLIER THAN USUAL IN THE MORNING: NOT AT ALL
ACT_TOTALSCORE: 24
QUESTION_1 LAST FOUR WEEKS HOW MUCH OF THE TIME DID YOUR ASTHMA KEEP YOU FROM GETTING AS MUCH DONE AT WORK, SCHOOL OR AT HOME: NONE OF THE TIME

## 2024-06-23 ASSESSMENT — PATIENT HEALTH QUESTIONNAIRE - PHQ9
SUM OF ALL RESPONSES TO PHQ QUESTIONS 1-9: 0
SUM OF ALL RESPONSES TO PHQ QUESTIONS 1-9: 0
10. IF YOU CHECKED OFF ANY PROBLEMS, HOW DIFFICULT HAVE THESE PROBLEMS MADE IT FOR YOU TO DO YOUR WORK, TAKE CARE OF THINGS AT HOME, OR GET ALONG WITH OTHER PEOPLE: NOT DIFFICULT AT ALL

## 2024-06-24 ENCOUNTER — VIRTUAL VISIT (OUTPATIENT)
Dept: PEDIATRICS | Facility: CLINIC | Age: 49
End: 2024-06-24
Payer: COMMERCIAL

## 2024-06-24 DIAGNOSIS — E66.01 MORBID OBESITY (H): Primary | ICD-10-CM

## 2024-06-24 DIAGNOSIS — R51.9 CHRONIC DAILY HEADACHE: ICD-10-CM

## 2024-06-24 DIAGNOSIS — Z87.898 HISTORY OF EPIGASTRIC PAIN: ICD-10-CM

## 2024-06-24 PROBLEM — Z72.0 NICOTINE ABUSE: Status: RESOLVED | Noted: 2018-11-12 | Resolved: 2024-06-24

## 2024-06-24 PROCEDURE — 99443 PR PHYSICIAN TELEPHONE EVALUATION 21-30 MIN: CPT | Mod: 93 | Performed by: NURSE PRACTITIONER

## 2024-06-24 RX ORDER — TOPIRAMATE 25 MG/1
TABLET, FILM COATED ORAL
Qty: 318 TABLET | Refills: 0 | Status: SHIPPED | OUTPATIENT
Start: 2024-06-24 | End: 2024-09-09

## 2024-06-24 NOTE — PROGRESS NOTES
Carri is a 49 year old who is being evaluated via a billable telephone visit.      Originating Location (pt. Location): Home    Distant Location (provider location):  Off-site    Assessment & Plan     (E66.01) Morbid obesity (H)  (primary encounter diagnosis)  Comment: Discussed at length. Is doing vigorous weight lifting/cardi 2-3x per week but is otherwise sedentary. Is eating quite a bit of protein and cut out alcohol but not tracking overall calories. Snores mildly but denies other sx of sleep apnea. Has lost a few lbs of fat and gained muscle but is frustrated with progress. Unclear if she's a candidate for GLP-1. See recent ED note in which the ER doc thought she may have had mild pancreatitis. However, had normal lipase. I think it's possible she was actually having gastritis given that she takes aspirin multiple times per day (excedrin) and the fact that the lipase was negative. If wanting to pursue GLP-1 would need to do econsult with a gastroenterologist to discuss  Plan:   -Will start tracking in my fitness pal  -Increase non exercise physical activity such as adding a 10 minute walk daily  -Topamax for headaches may cause weight loss  -Call insurance to ask about GLP-1s    (R51.9) Chronic daily headache  Comment: No contraindications. Has had a chronic daily headache since she was 14 years old with negative workup. Also gets migraines, and these have been more frequent since starting weight lifting. Has been taking excedrin migraine (with asa) multiple times per day for years  Plan: topiramate (TOPAMAX) 25 MG tablet          -Reduce lifting above head  -Trial topamax. Reviewed r/b/se and titraton  -Discussed analgesic overuse/rebound. She will try to reduce  -If not significantly improved consider re-imaging    (Z87.898) History of epigastric pain  Comment: See ED note from the winter. See above. Suspect gastritis not pancreatitis with incidental pancreas findings. May need to consult GI in the future  "if it happens again or if wanting to try a GLP-1 as hx pancreatitis would be a contraindication to GLP-1 use.   Plan:   -Try to eliminate aspirin/nsaid use  -Continue to avoid ETOH        The longitudinal plan of care for the diagnosis(es)/condition(s) as documented were addressed during this visit. Due to the added complexity in care, I will continue to support Carri in the subsequent management and with ongoing continuity of care.  BMI  Estimated body mass index is 40.77 kg/m  as calculated from the following:    Height as of 5/3/24: 1.651 m (5' 5\").    Weight as of 5/3/24: 111.1 kg (245 lb).   Weight management plan: Discussed healthy diet and exercise guidelines      See Patient Instructions    Carol Christina is a 49 year old, presenting for the following health issues:  No chief complaint on file.    HPI       Review of Systems  Constitutional, HEENT, cardiovascular, pulmonary, gi and gu systems are negative, except as otherwise noted.      Objective         Vitals:  No vitals were obtained today due to virtual visit.    Physical Exam   General: Alert and no distress //Respiratory: No audible wheeze, cough, or shortness of breath // Psychiatric:  Appropriate affect, tone, and pace of words            Phone call duration: 25 minutes  Signed Electronically by: WARD MEDELLIN CNP      "

## 2024-07-19 ENCOUNTER — TELEPHONE (OUTPATIENT)
Dept: GASTROENTEROLOGY | Facility: CLINIC | Age: 49
End: 2024-07-19
Payer: COMMERCIAL

## 2024-07-19 NOTE — TELEPHONE ENCOUNTER
"Endoscopy Scheduling Screen    Have you had a positive Covid test in the last 14 days?  No    What is your communication preference for Instructions and/or Bowel Prep?   MyChart    What insurance is in the chart?  Other:  BCBS    Ordering/Referring Provider: Selena Altamirano APRN CNP in  IM/PEDS     (If ordering provider performs procedure, schedule with ordering provider unless otherwise instructed. )    BMI: Estimated body mass index is 40.77 kg/m  as calculated from the following:    Height as of 5/3/24: 1.651 m (5' 5\").    Weight as of 5/3/24: 111.1 kg (245 lb).     Sedation Ordered  moderate sedation.   If patient BMI > 50 do not schedule in [facility].    If patient BMI > 45 do not schedule at [facility].    Are you taking methadone or Suboxone?  No    Have you had difficulties, pain, or discomfort during past endoscopy procedures?  No    Are you taking any prescription medications for pain 3 or more times per week?   NO, No RN review required.    Do you have a history of malignant hyperthermia?  No    (Females) Are you currently pregnant?   No     Have you been diagnosed or told you have pulmonary hypertension?   No    Do you have an LVAD?  No    Have you been told you have moderate to severe sleep apnea?  No    Have you been told you have COPD, asthma, or any other lung disease?  Yes     What breathing problems do you have?  Asthma     Do you use home oxygen?  No    Have your breathing problems required an ED visit or hospitalization in the last year?  No    Do you have any heart conditions?  No     Have you ever had or are you waiting for an organ transplant?  No. Continue scheduling, no site restrictions.    Have you had a stroke or transient ischemic attack (TIA aka \"mini stroke\" in the last 6 months?   No    Have you been diagnosed with or been told you have cirrhosis of the liver?   No    Are you currently on dialysis?   No    Do you need assistance transferring?   No    BMI: Estimated body mass index " "is 40.77 kg/m  as calculated from the following:    Height as of 5/3/24: 1.651 m (5' 5\").    Weight as of 5/3/24: 111.1 kg (245 lb).     Is patients BMI > 40 and scheduling location [facility]?  No    Do you take an injectable medication for weight loss or diabetes (excluding insulin)?  No    Do you take the medication Naltrexone?  No    Do you take blood thinners?  No       Prep   Are you currently on dialysis or do you have chronic kidney disease?  No    Do you have a diagnosis of diabetes?  No    Do you have a diagnosis of cystic fibrosis (CF)?  No    On a regular basis do you go 3 -5 days between bowel movements?  No    BMI > 40?  Yes (Extended Prep)    Preferred Pharmacy:      [id number] - [place], [place] - [address]  [address] [facility] [address]  Phone: [phone] Fax: [phone]      Final Scheduling Details     Procedure scheduled  Colonoscopy    Surgeon:  NANCY     Date of procedure:  11/11     Pre-OP / PAC:   No - Not required for this site.    Location  [facility] - Patient preference.    Sedation   Moderate Sedation - Per order.      Patient Reminders:   You will receive a call from a Nurse to review instructions and health history.  This assessment must be completed prior to your procedure.  Failure to complete the Nurse assessment may result in the procedure being cancelled.      On the day of your procedure, please designate an adult(s) who can drive you home stay with you for the next 24 hours. The medicines used in the exam will make you sleepy. You will not be able to drive.      You cannot take public transportation, ride share services, or non-medical taxi service without a responsible caregiver.  Medical transport services are allowed with the requirement that a responsible caregiver will receive you at your destination.  We require that drivers and caregivers are confirmed prior to your procedure.    "

## 2024-08-11 ENCOUNTER — HEALTH MAINTENANCE LETTER (OUTPATIENT)
Age: 49
End: 2024-08-11

## 2024-08-13 DIAGNOSIS — R51.9 CHRONIC DAILY HEADACHE: ICD-10-CM

## 2024-08-13 NOTE — TELEPHONE ENCOUNTER
Clinic RN: Please contact patient because the last prescription was a taper dose (not in RN protocol). We need to know what dose patient is taking. Determine the current dose, then route to provider and ask provider to update the SIG and approve or deny the prescription.    Lorie BLAIR RN  Patient Advocate Liaison - PAL RN  [facility]    Verified Results  COMP METABOLIC PANEL WITH CBCA, LIPID PANEL AND TSH (CMP,CBCA,LIPFA,TSH) 08Apr2017 12:01AM TERRI ABERNATHY   [Apr 10, 2017 11:03AM TERRI ABERNATHY]  all labs stable, diabetes under great control.     Test Name Result Flag Reference   WHITE BLOOD COUNT 7.4 K/mcL  4.2-11.0   RED CELL COUNT 4.84 mil/mcL  4.00-5.20   HEMOGLOBIN 13.3 g/dl  12.0-15.5   HEMATOCRIT 39.8 %  36.0-46.5   MEAN CORPUSCULAR VOLUME 82.2 fL  78.0-100.0   MEAN CORPUSCULAR HEMOGLOBIN 27.5 pg  26.0-34.0   MEAN CORPUSCULAR HGB CONC 33.4 g/dl  32.0-36.5   RDW-CV 13.6 %  11.0-15.0   PLATELET COUNT 158 K/mcL  140-450   DIFF TYPE      AUTOMATED DIFFERENTIAL   MARYAM% 67 %     LYM% 22 %     MON% 9 %     EOS% 2 %     BASO% 0 %     MARYAM ABS 5.0 K/mcL  1.8-7.7   LYM ABS 1.6 K/mcL  1.0-4.0   MON ABS 0.7 K/mcL  0.3-0.9   EOS ABS 0.2 K/mcL  0.1-0.5   BASO ABS 0.0 K/mcL  0.0-0.3   FASTING STATUS UNKNOWN hrs     SODIUM 132 mmol/L L 135-145   POTASSIUM 4.1 mmol/L  3.4-5.1   CHLORIDE 96 mmol/L L    CARBON DIOXIDE 26 mmol/L  21-32   ANION GAP 14 mmol/L  10-20   GLUCOSE 146 mg/dl H 65-99   BUN 8 mg/dl  6-20   CREATININE 0.55 mg/dl  0.51-0.95   GFR EST.AFRICAN AMER >90     eGFR results = or >90 mL/min/1.73m2 = Normal kidney function.   GFR EST.NONAFRI AMER >90     eGFR results = or >90 mL/min/1.73m2 = Normal kidney function.   BUN/CREATININE RATIO 15  7-25   CALCIUM 9.3 mg/dl  8.4-10.2   BILIRUBIN TOTAL 1.4 mg/dl H 0.2-1.0   GOT/AST 6 Units/L  <38   GPT/ALT 13 Units/L  <79   ALKALINE PHOSPHATASE 51 Units/L     TOTAL PROTEIN 7.1 g/dl  6.4-8.2   ALBUMIN 3.8 g/dl  3.6-5.1   GLOBULIN (CALCULATED) 3.3 g/dl  2.0-4.0   A/G RATIO 1.2  1.0-2.4   FASTING STATUS UNKNOWN hrs     CHOLESTEROL 185 mg/dl  <200   Desirable            <200  Borderline High      200 to 239  High                 >=240   LDL CHOLESTEROL (CALCULATED) 103 mg/dl  <130   OPTIMAL               <100  NEAR OPTIMAL          100-129  BORDERLINE HIGH       130-159  HIGH                   160-189  VERY HIGH             >=190   HDL CHOLESTEROL 49 mg/dl L >59   Low            <40  Borderline Low 40 to 49  Near Optimal   50 to 59  Optimal        >=60   TRIGLYCERIDES 165 mg/dl H <150   Normal                   <150  Borderline High          150 to 199  High                     200 to 499  Very High                >=500   NON-HDL CHOLESTEROL 136 mg/dl     Therapeutic Target:  CHD and risk equivalents <130  Multiple risk factors    <160  0 to 1 risk factors      <190   CHOLESTEROL/HDL RATIO 3.8  <4.5   TSH 3.178 mcUnits/mL  0.350-5.000     HEMOGLOBIN A1C GLYCOSYLATED 08Apr2017 12:01AM TERRI ABERNATHY   [Apr 10, 2017 11:03AM TERRI ABERNATHY]  all labs stable, diabetes under great control.     Test Name Result Flag Reference   HEMOGLOBIN A1C GLYH 6.2 % H 4.5-5.6   ----DIABETIC SCREENING---  NON DIABETIC                 <5.7%  INCREASED RISK                5.7-6.4%  DIAGNOSTIC FOR DIABETES      >6.4%     ----DIABETIC CONTROL---     A1C%           eAG mg/dL  6.0            126  6.5            140  7.0            154  7.5            169  8.0            183  8.5            197  9.0            212  9.5            226  10.0           240

## 2024-08-14 NOTE — TELEPHONE ENCOUNTER
Attempted to reach patient, [facility].     Xander TESFAYE RN 8/14/2024 at 12:01 PM

## 2024-08-15 NOTE — TELEPHONE ENCOUNTER
RN attempt #2 to reach patient. LVMTCB and speak with nurse.     Sarah FUENTES RN on 8/15/2024 at 10:47 AM

## 2024-08-16 ENCOUNTER — MYC MEDICAL ADVICE (OUTPATIENT)
Dept: PEDIATRICS | Facility: CLINIC | Age: 49
End: 2024-08-16
Payer: COMMERCIAL

## 2024-08-16 RX ORDER — TOPIRAMATE 25 MG/1
TABLET, FILM COATED ORAL
Qty: 318 TABLET | Refills: 0 | OUTPATIENT
Start: 2024-08-16

## 2024-09-09 ENCOUNTER — VIRTUAL VISIT (OUTPATIENT)
Dept: PEDIATRICS | Facility: CLINIC | Age: 49
End: 2024-09-09
Payer: COMMERCIAL

## 2024-09-09 DIAGNOSIS — Z86.69 HISTORY OF MIGRAINE: ICD-10-CM

## 2024-09-09 DIAGNOSIS — Z12.31 VISIT FOR SCREENING MAMMOGRAM: Primary | ICD-10-CM

## 2024-09-09 DIAGNOSIS — E66.01 MORBID OBESITY (H): ICD-10-CM

## 2024-09-09 PROCEDURE — 99442 PR PHYSICIAN TELEPHONE EVALUATION 11-20 MIN: CPT | Mod: 93 | Performed by: NURSE PRACTITIONER

## 2024-09-09 RX ORDER — NALTREXONE HYDROCHLORIDE 50 MG/1
TABLET, FILM COATED ORAL
Qty: 89 TABLET | Refills: 3 | Status: SHIPPED | OUTPATIENT
Start: 2024-09-09 | End: 2024-12-08

## 2024-09-09 RX ORDER — SUMATRIPTAN 50 MG/1
50 TABLET, FILM COATED ORAL
Qty: 18 TABLET | Refills: 3 | Status: SHIPPED | OUTPATIENT
Start: 2024-09-09

## 2024-09-09 NOTE — PROGRESS NOTES
"Carri is a 49 year old who is being evaluated via a billable telephone visit.      Originating Location (pt. Location): Home    Distant Location (provider location):  Off-site    Assessment & Plan     Visit for screening mammogram  - MA Screening Bilateral w/ Umair; Future    History of migraine  Migraines have been well controlled despite increase in stress and stopping topamax.   - SUMAtriptan (IMITREX) 50 MG tablet; Take 1 tablet (50 mg) by mouth at onset of headache for migraine. May repeat in 2 hours. Max 4 tablets/24 hours.    Morbid obesity (H)  Didn't tolerate topamax due to depressive thoughts. Not doing lifestyle interventions anymore due to new job  -Get back to the gym/healthy eating, high protein  -Start naltrexone. Reviewed r/b/se and opioid interactions  -Follow-up 3 monthsThe longitudinal plan of care for the diagnosis(es)/condition(s) as documented were addressed during this visit. Due to the added complexity in care, I will continue to support Carri in the subsequent management and with ongoing continuity of care.  - Hepatic panel (Albumin, ALT, AST, Bili, Alk Phos, TP); Future  - naltrexone (DEPADE/REVIA) 50 MG tablet; Take 0.5 tablets (25 mg) by mouth daily for 2 days, THEN 1 tablet (50 mg) daily.                Carol Christina is a 49 year old, presenting for the following health issues:  Migraine      9/9/2024     9:16 AM   Additional Questions   Roomed by Maria Teresa Valentine   Accompanied by N/A     History of Present Illness       Headaches:   Since the patient's last clinic visit, headaches are: no change  The patient is getting headaches:  10-15 a month  She is not able to do normal daily activities when she has a migraine.  The patient is taking the following rescue/relief medications:  Excedrin and sumatriptan (Imitrex)   Patient states \"I get some relief\" from the rescue/relief medications.   The patient is taking the following medications to prevent migraines:  No medications to prevent " migraines  In the past 4 weeks, the patient has gone to an Urgent Care or Emergency Room 0 times times due to headaches.    Reason for visit:  Migraines/obesity    She eats 0-1 servings of fruits and vegetables daily.She consumes 0 sweetened beverage(s) daily.She exercises with enough effort to increase her heart rate 10 to 19 minutes per day.  She exercises with enough effort to increase her heart rate 3 or less days per week.   She is taking medications regularly.                   Objective    Vitals - Patient Reported  Weight (Patient Reported): 109.2 kg (240 lb 12.8 oz)  Pain Score: No Pain (0)        Physical Exam   General: Alert and no distress //Respiratory: No audible wheeze, cough, or shortness of breath // Psychiatric:  Appropriate affect, tone, and pace of words            Phone call duration: 15 minutes  Signed Electronically by: WARD MEDELLIN CNP

## 2024-09-09 NOTE — PROGRESS NOTES
"Carri is a 49 year old who is being evaluated via a billable telephone visit.    {ROOMING STAFF complete during rooming of virtual visit (Optional):946737}  Originating Location (pt. Location): {patient location:949361::\"Home\"}  {PROVIDER LOCATION On-site should be selected for visits conducted from your clinic location or adjoining [facility] hospital, academic office, or other nearby [facility] building. Off-site should be selected for all other provider locations, including home:134083}  Distant Location (provider location):  On-site    {PROVIDER CHARTING PREFERENCE:705652}    Subjective   Carri is a 49 year old, presenting for the following health issues:  Migraine        9/9/2024     9:16 AM   Additional Questions   Roomed by Maria Teresa Valentine   Accompanied by N/A       History of Present Illness       Headaches:   Since the patient's last clinic visit, headaches are: no change  The patient is getting headaches:  10-15 a month  She is not able to do normal daily activities when she has a migraine.  The patient is taking the following rescue/relief medications:  Excedrin and sumatriptan (Imitrex)   Patient states \"I get some relief\" from the rescue/relief medications.   The patient is taking the following medications to prevent migraines:  No medications to prevent migraines  In the past 4 weeks, the patient has gone to an Urgent Care or Emergency Room 0 times times due to headaches.    Reason for visit:  Migraines/obesity    She eats 0-1 servings of fruits and vegetables daily.She consumes 0 sweetened beverage(s) daily.She exercises with enough effort to increase her heart rate 10 to 19 minutes per day.  She exercises with enough effort to increase her heart rate 3 or less days per week.   She is taking medications regularly.       {SUPERLIST (Optional):407157}  {additonal problems for provider to add (Optional):738356}    {ROS Picklists (Optional):317960}      Objective    Vitals - Patient Reported  Weight (Patient Reported): " 109.2 kg (240 lb 12.8 oz)  Pain Score: No Pain (0)      Vitals:  No vitals were obtained today due to virtual visit.    Physical Exam   General: Alert and no distress //Respiratory: No audible wheeze, cough, or shortness of breath // Psychiatric:  Appropriate affect, tone, and pace of words      {Diagnostic Test Results (Optional):743161}      Phone call duration: *** minutes  Signed Electronically by: WARD MEDELLIN CNP  {Email feedback regarding this note to [email or web address]   :035285}

## 2024-10-20 ENCOUNTER — HEALTH MAINTENANCE LETTER (OUTPATIENT)
Age: 49
End: 2024-10-20

## 2024-11-11 ENCOUNTER — HOSPITAL ENCOUNTER (OUTPATIENT)
Facility: CLINIC | Age: 49
Discharge: HOME | End: 2024-11-11
Attending: INTERNAL MEDICINE | Admitting: INTERNAL MEDICINE
Payer: COMMERCIAL

## 2024-11-11 VITALS
DIASTOLIC BLOOD PRESSURE: 74 MMHG | HEART RATE: 75 BPM | SYSTOLIC BLOOD PRESSURE: 119 MMHG | RESPIRATION RATE: 16 BRPM | HEIGHT: 65 IN | WEIGHT: 245 LBS | BODY MASS INDEX: 40.82 KG/M2 | OXYGEN SATURATION: 98 %

## 2024-11-11 PROBLEM — K57.30 DIVERTICULOSIS OF LARGE INTESTINE: Status: ACTIVE | Noted: 2024-11-11

## 2024-11-11 LAB — COLONOSCOPY: NORMAL

## 2024-11-11 PROCEDURE — 250N000011 HC RX IP 250 OP 636: Performed by: INTERNAL MEDICINE

## 2024-11-11 PROCEDURE — 45378 DIAGNOSTIC COLONOSCOPY: CPT | Performed by: INTERNAL MEDICINE

## 2024-11-11 PROCEDURE — G0500 MOD SEDAT ENDO SERVICE >5YRS: HCPCS | Performed by: INTERNAL MEDICINE

## 2024-11-11 PROCEDURE — G0121 COLON CA SCRN NOT HI RSK IND: HCPCS | Performed by: INTERNAL MEDICINE

## 2024-11-11 RX ORDER — ATROPINE SULFATE 0.1 MG/ML
1 INJECTION INTRAVENOUS
Status: DISCONTINUED | OUTPATIENT
Start: 2024-11-11 | End: 2024-11-11 | Stop reason: HOSPADM

## 2024-11-11 RX ORDER — NALOXONE HYDROCHLORIDE 0.4 MG/ML
0.4 INJECTION, SOLUTION INTRAMUSCULAR; INTRAVENOUS; SUBCUTANEOUS
Status: DISCONTINUED | OUTPATIENT
Start: 2024-11-11 | End: 2024-11-11 | Stop reason: HOSPADM

## 2024-11-11 RX ORDER — ONDANSETRON 4 MG/1
4 TABLET, ORALLY DISINTEGRATING ORAL EVERY 6 HOURS PRN
Status: DISCONTINUED | OUTPATIENT
Start: 2024-11-11 | End: 2024-11-11 | Stop reason: HOSPADM

## 2024-11-11 RX ORDER — EPINEPHRINE 1 MG/ML
0.1 INJECTION, SOLUTION, CONCENTRATE INTRAVENOUS
Status: DISCONTINUED | OUTPATIENT
Start: 2024-11-11 | End: 2024-11-11 | Stop reason: HOSPADM

## 2024-11-11 RX ORDER — NALOXONE HYDROCHLORIDE 0.4 MG/ML
0.2 INJECTION, SOLUTION INTRAMUSCULAR; INTRAVENOUS; SUBCUTANEOUS
Status: DISCONTINUED | OUTPATIENT
Start: 2024-11-11 | End: 2024-11-11 | Stop reason: HOSPADM

## 2024-11-11 RX ORDER — ONDANSETRON 2 MG/ML
4 INJECTION INTRAMUSCULAR; INTRAVENOUS EVERY 6 HOURS PRN
Status: DISCONTINUED | OUTPATIENT
Start: 2024-11-11 | End: 2024-11-11 | Stop reason: HOSPADM

## 2024-11-11 RX ORDER — FLUMAZENIL 0.1 MG/ML
0.2 INJECTION, SOLUTION INTRAVENOUS
Status: DISCONTINUED | OUTPATIENT
Start: 2024-11-11 | End: 2024-11-11 | Stop reason: HOSPADM

## 2024-11-11 RX ORDER — PROCHLORPERAZINE MALEATE 10 MG
10 TABLET ORAL EVERY 6 HOURS PRN
Status: DISCONTINUED | OUTPATIENT
Start: 2024-11-11 | End: 2024-11-11 | Stop reason: HOSPADM

## 2024-11-11 RX ORDER — FENTANYL CITRATE 50 UG/ML
50-100 INJECTION, SOLUTION INTRAMUSCULAR; INTRAVENOUS EVERY 5 MIN PRN
Status: DISCONTINUED | OUTPATIENT
Start: 2024-11-11 | End: 2024-11-11 | Stop reason: HOSPADM

## 2024-11-11 RX ORDER — LIDOCAINE 40 MG/G
CREAM TOPICAL
Status: DISCONTINUED | OUTPATIENT
Start: 2024-11-11 | End: 2024-11-11 | Stop reason: HOSPADM

## 2024-11-11 RX ORDER — SIMETHICONE 40MG/0.6ML
133 SUSPENSION, DROPS(FINAL DOSAGE FORM)(ML) ORAL
Status: DISCONTINUED | OUTPATIENT
Start: 2024-11-11 | End: 2024-11-11 | Stop reason: HOSPADM

## 2024-11-11 RX ORDER — ONDANSETRON 2 MG/ML
4 INJECTION INTRAMUSCULAR; INTRAVENOUS
Status: DISCONTINUED | OUTPATIENT
Start: 2024-11-11 | End: 2024-11-11 | Stop reason: HOSPADM

## 2024-11-11 RX ORDER — DIPHENHYDRAMINE HYDROCHLORIDE 50 MG/ML
25-50 INJECTION INTRAMUSCULAR; INTRAVENOUS
Status: DISCONTINUED | OUTPATIENT
Start: 2024-11-11 | End: 2024-11-11 | Stop reason: HOSPADM

## 2024-11-11 RX ADMIN — MIDAZOLAM HYDROCHLORIDE 2 MG: 1 INJECTION, SOLUTION INTRAMUSCULAR; INTRAVENOUS at 08:48

## 2024-11-11 RX ADMIN — FENTANYL CITRATE 50 MCG: 0.05 INJECTION, SOLUTION INTRAMUSCULAR; INTRAVENOUS at 08:46

## 2024-11-11 RX ADMIN — FENTANYL CITRATE 50 MCG: 0.05 INJECTION, SOLUTION INTRAMUSCULAR; INTRAVENOUS at 08:48

## 2024-11-11 RX ADMIN — MIDAZOLAM HYDROCHLORIDE 2 MG: 1 INJECTION, SOLUTION INTRAMUSCULAR; INTRAVENOUS at 08:46

## 2024-11-11 RX ADMIN — ONDANSETRON 4 MG: 2 INJECTION INTRAMUSCULAR; INTRAVENOUS at 08:30

## 2024-11-11 ASSESSMENT — ACTIVITIES OF DAILY LIVING (ADL)
ADLS_ACUITY_SCORE: 0
ADLS_ACUITY_SCORE: 0

## 2024-11-11 NOTE — H&P
[facility]  Pre-Endoscopy History and Physical     Carri Estrada MRN# [id number]   YOB: 1975 Age: 49 year old     Date of Procedure: 2024  Primary care provider: Selena Altamirano  Type of Endoscopy: colonoscopy  Reason for Procedure: screening  Type of Anesthesia Anticipated: Moderate Sedation    HPI:    Carri is a 49 year old female who will be undergoing the above procedure.      A history and physical has been performed. The patient's medications and allergies have been reviewed. The risks and benefits of the procedure and the sedation options and risks were discussed with the patient.  All questions were answered and informed consent was obtained.      She denies a personal or family history of anesthesia complications or bleeding disorders.     Allergies   Allergen Reactions    Foeniculum Vulgare Swelling    No Clinical Screening - See Comments      Other reaction(s): Edema    Topamax [Topiramate] Other (See Comments)     Severe depression    Tree Nuts [Nuts]         Prior to Admission Medications   Prescriptions Last Dose Informant Patient Reported? Taking?   Aspirin-Acetaminophen-Caffeine (EXCEDRIN PO) 11/10/2024  Yes Yes   Sig: Take 600 mg by mouth daily   SUMAtriptan (IMITREX) 50 MG tablet 11/10/2024  No Yes   Sig: Take 1 tablet (50 mg) by mouth at onset of headache for migraine. May repeat in 2 hours. Max 4 tablets/24 hours.   albuterol (PROAIR HFA/PROVENTIL HFA/VENTOLIN HFA) 108 (90 Base) MCG/ACT inhaler   No No   Sig: Inhale 2 puffs into the lungs every 4 hours as needed for shortness of breath or wheezing   cetirizine (ZYRTEC) 2.5 mg half-tab Past Week  Yes Yes   Sig: Take 2.5 mg by mouth daily   levonorgestrel (MIRENA) 20 MCG/24HR IUD   No No   Si each (20 mcg) by Intrauterine route continuous      Facility-Administered Medications: None       Patient Active Problem List   Diagnosis    Dysmenorrhea    Obesity, Class II, BMI 35-39.9    IUD  "(intrauterine device) in place    Morbid obesity (H)    Seasonal affective disorder (H)    Hyperlipidemia LDL goal <100        Past Medical History:   Diagnosis Date    Complication of anesthesia     Depressive disorder     Seasonal    PONV (postoperative nausea and vomiting)     Uncomplicated asthma when i was young        Past Surgical History:   Procedure Laterality Date    VAGINA RECONSTRUCTION SURGERY         Social History     Tobacco Use    Smoking status: Former     Current packs/day: 0.00     Average packs/day: 0.5 packs/day for 35.0 years (17.5 ttl pk-yrs)     Types: Cigarettes     Start date: 1983     Quit date: 2018     Years since quittin.9    Smokeless tobacco: Never   Substance Use Topics    Alcohol use: Not Currently       Family History   Problem Relation Age of Onset    Cancer Mother     Anxiety Disorder Mother     Depression Mother     Obesity Mother     Other Cancer Mother     Myocardial Infarction Father     Anxiety Disorder Father     Depression Father     Hypertension Father     Obesity Father     Hyperlipidemia Father     Heart Disease Father     Breast Cancer Paternal Grandmother     Obesity Paternal Grandmother     Alzheimer Disease Paternal Grandmother     Heart Disease Paternal Grandfather     Family History Negative Brother     Anxiety Disorder Brother     Depression Brother     Family History Negative Sister     Ulcerative Colitis Son     Cerebrovascular Disease No family hx of     Colon Cancer No family hx of        REVIEW OF SYSTEMS:     5 point ROS negative except as noted above in HPI, including Gen., Resp., CV, GI &  system review.      PHYSICAL EXAM:   /80   Pulse 86   Resp 27   Ht 1.651 m (5' 5\")   Wt 111.1 kg (245 lb)   SpO2 97%   BMI 40.77 kg/m   Estimated body mass index is 40.77 kg/m  as calculated from the following:    Height as of this encounter: 1.651 m (5' 5\").    Weight as of this encounter: 111.1 kg (245 lb).   GENERAL APPEARANCE: healthy, " alert, and no distress  MENTAL STATUS: alert  AIRWAY EXAM: Mallampatti Class II (visualization of the soft palate, fauces, and uvula)  RESP: lungs clear to auscultation - no rales, rhonchi or wheezes  CV: regular rates and rhythm      DIAGNOSTICS:    Not indicated      IMPRESSION   ASA Class 3 - Severe systemic disease, but not incapacitating        PLAN:       Plan for colonoscopy. We discussed the risks, benefits and alternatives and the patient wished to proceed.    The above has been forwarded to the consulting provider.      Signed Electronically by: Edin Freeman MD  November 11, 2024    Edin Freeman MD  [facility]  Cell: [phone]  Office Phone: [phone]  Office Fax: [phone]

## 2024-12-12 ENCOUNTER — PATIENT OUTREACH (OUTPATIENT)
Dept: CARE COORDINATION | Facility: CLINIC | Age: 49
End: 2024-12-12
Payer: COMMERCIAL

## 2025-01-01 ENCOUNTER — MYC REFILL (OUTPATIENT)
Dept: PEDIATRICS | Facility: CLINIC | Age: 50
End: 2025-01-01
Payer: COMMERCIAL

## 2025-01-01 DIAGNOSIS — Z86.69 HISTORY OF MIGRAINE: ICD-10-CM

## 2025-01-02 RX ORDER — SUMATRIPTAN SUCCINATE 100 MG/1
100 TABLET ORAL
Qty: 18 TABLET | Refills: 0 | Status: SHIPPED | OUTPATIENT
Start: 2025-01-02

## 2025-01-02 NOTE — TELEPHONE ENCOUNTER
"I refilled at the 100mg dose.     Please be sure she isn't taking too much:  \"50 to 100 mg as a single dose (Ref). If symptoms persist or return, may repeat dose (usually same as first dose) after >=2 hours. Maximum dose: 100 mg/dose; 200 mg per 24 hours. \"    Needs visit to address worsening migraines. Ok for video visit. Ok for myself or nandini jose  "

## 2025-01-03 ASSESSMENT — ASTHMA QUESTIONNAIRES
ACT_TOTALSCORE: 23
QUESTION_4 LAST FOUR WEEKS HOW OFTEN HAVE YOU USED YOUR RESCUE INHALER OR NEBULIZER MEDICATION (SUCH AS ALBUTEROL): NOT AT ALL
ACT_TOTALSCORE: 23
QUESTION_2 LAST FOUR WEEKS HOW OFTEN HAVE YOU HAD SHORTNESS OF BREATH: THREE TO SIX TIMES A WEEK
QUESTION_3 LAST FOUR WEEKS HOW OFTEN DID YOUR ASTHMA SYMPTOMS (WHEEZING, COUGHING, SHORTNESS OF BREATH, CHEST TIGHTNESS OR PAIN) WAKE YOU UP AT NIGHT OR EARLIER THAN USUAL IN THE MORNING: NOT AT ALL
QUESTION_1 LAST FOUR WEEKS HOW MUCH OF THE TIME DID YOUR ASTHMA KEEP YOU FROM GETTING AS MUCH DONE AT WORK, SCHOOL OR AT HOME: NONE OF THE TIME
QUESTION_5 LAST FOUR WEEKS HOW WOULD YOU RATE YOUR ASTHMA CONTROL: COMPLETELY CONTROLLED

## 2025-01-06 ENCOUNTER — VIRTUAL VISIT (OUTPATIENT)
Dept: PEDIATRICS | Facility: CLINIC | Age: 50
End: 2025-01-06
Payer: COMMERCIAL

## 2025-01-06 DIAGNOSIS — G43.009 MIGRAINE WITHOUT AURA AND WITHOUT STATUS MIGRAINOSUS, NOT INTRACTABLE: Primary | ICD-10-CM

## 2025-01-06 DIAGNOSIS — N93.8 DUB (DYSFUNCTIONAL UTERINE BLEEDING): ICD-10-CM

## 2025-01-06 PROBLEM — E66.01 MORBID OBESITY (H): Status: RESOLVED | Noted: 2019-08-20 | Resolved: 2025-01-06

## 2025-01-06 PROCEDURE — 98014 SYNCH AUDIO-ONLY EST MOD 30: CPT | Performed by: NURSE PRACTITIONER

## 2025-01-06 RX ORDER — PROPRANOLOL HYDROCHLORIDE 60 MG/1
60 CAPSULE, EXTENDED RELEASE ORAL DAILY
Qty: 90 CAPSULE | Refills: 3 | Status: SHIPPED | OUTPATIENT
Start: 2025-01-06

## 2025-01-06 NOTE — PROGRESS NOTES
"Carri is a 49 year old who is being evaluated via a billable telephone visit.      Originating Location (pt. Location): Home    Distant Location (provider location):  Off-site    Assessment & Plan     Migraine without aura and without status migrainosus, not intractable  Pt with chronic daily headaches and 8-10 migraines per month since age 14. Migraines have not been responsive recently to imitrex. Cannot increase the dose because it causes nausea. Is taking excedrin daily to BID, which could be contributing  - Adult Neurology  Referral; Future  - propranolol ER (INDERAL LA) 60 MG 24 hr capsule; Take 1 capsule (60 mg) by mouth daily.  -Hold off on imaging until deemed necessary by neuro  -Start propranolol. If not starting to improve in a month, can increase dose  -Dicussed that neuro would likely recommend analgesic washout period  -Asked her to discuss some of the newer prophylactic and abortive treatments with neuro  -Switch to maxalt to see if she tolerates better.     DUB (dysfunctional uterine bleeding)  Now spotting twice a month. Given increase in frequency over age 45, need to rule out endometrial CA. I discussed this with pt. Also discussed that her IUD being 6 years old may be contributing. Is still effective for contraception but  may not be effective enough to stabilize her endometrium  - US Pelvic Complete with Transvaginal; Future  -In the future consider checking TSH        BMI  Estimated body mass index is 40.77 kg/m  as calculated from the following:    Height as of 11/11/24: 1.651 m (5' 5\").    Weight as of 11/11/24: 111.1 kg (245 lb).             Subjective   12.5mg used to work. Now having to take 50 and not working. 50mg doesn't sit well. Gradual worsening over a couple years. Imitrex 9-10 days per month    Has IUD. Spotting every 2 weeks. Getting migraines every 2 weeks in clusters    Taking Excedrin daily and when you get a headache    (Z87.898) History of epigastric pain  Comment: " See ED note from the winter. See above. Suspect gastritis not pancreatitis with incidental pancreas findings. May need to consult GI in the future if it happens again or if wanting to try a GLP-1 as hx pancreatitis would be a contraindication to GLP-1 use.   Carri is a 49 year old, presenting for the following health issues:  No chief complaint on file.    HPI                 Objective           Vitals:  No vitals were obtained today due to virtual visit.    Physical Exam   General: Alert and no distress //Respiratory: No audible wheeze, cough, or shortness of breath // Psychiatric:  Appropriate affect, tone, and pace of words            Phone call duration: 20 minutes  Signed Electronically by: WARD MEDELLIN CNP

## 2025-01-07 ENCOUNTER — PATIENT OUTREACH (OUTPATIENT)
Dept: CARE COORDINATION | Facility: CLINIC | Age: 50
End: 2025-01-07
Payer: COMMERCIAL

## 2025-01-16 ENCOUNTER — PATIENT OUTREACH (OUTPATIENT)
Dept: CARE COORDINATION | Facility: CLINIC | Age: 50
End: 2025-01-16
Payer: COMMERCIAL

## 2025-02-23 ENCOUNTER — MYC REFILL (OUTPATIENT)
Dept: PEDIATRICS | Facility: CLINIC | Age: 50
End: 2025-02-23
Payer: COMMERCIAL

## 2025-02-23 DIAGNOSIS — Z86.69 HISTORY OF MIGRAINE: ICD-10-CM

## 2025-02-24 RX ORDER — SUMATRIPTAN SUCCINATE 100 MG/1
100 TABLET ORAL
Qty: 18 TABLET | Refills: 0 | Status: SHIPPED | OUTPATIENT
Start: 2025-02-24

## 2025-03-19 NOTE — CONFIDENTIAL NOTE
Neurology Pre Visit Planning    Reason for visit: Migraine without aura and without status migrainosus, not intractable    Referring Provider: Selena Altamirano APRN CNP  [facility]      Office Visit Notes: 01/6/2025       All IMAGING   STATUS/LOCATION   DATE   MRI/MRA N/A     CT/CTA N/A     LABS Internal    EEG N/A    EMG N/A    NEUROPSYCH   TEST: N/A

## 2025-04-21 ENCOUNTER — MYC REFILL (OUTPATIENT)
Dept: PEDIATRICS | Facility: CLINIC | Age: 50
End: 2025-04-21
Payer: COMMERCIAL

## 2025-04-21 DIAGNOSIS — Z86.69 HISTORY OF MIGRAINE: ICD-10-CM

## 2025-04-21 NOTE — TELEPHONE ENCOUNTER
Called and LVM to schedule physical at next available and then refill will be done to get through to appt.  Camila Abebe, CMA

## 2025-04-22 RX ORDER — SUMATRIPTAN SUCCINATE 100 MG/1
100 TABLET ORAL
Qty: 18 TABLET | Refills: 0 | Status: SHIPPED | OUTPATIENT
Start: 2025-04-22

## 2025-05-25 ASSESSMENT — HEADACHE IMPACT TEST (HIT 6)
WHEN YOU HAVE HEADACHES HOW OFTEN IS THE PAIN SEVERE: SOMETIMES
HOW OFTEN HAVE YOU FELT TOO TIRED TO WORK BECAUSE OF YOUR HEADACHES: SOMETIMES
HOW OFTEN DO HEADACHES LIMIT YOUR DAILY ACTIVITIES: SOMETIMES
HIT6 TOTAL SCORE: 61
WHEN YOU HAVE A HEADACHE HOW OFTEN DO YOU WISH YOU COULD LIE DOWN: VERY OFTEN
HOW OFTEN HAVE YOU FELT FED UP OR IRRITATED BECAUSE OF YOUR HEADACHES: SOMETIMES
HOW OFTEN DID HEADACHS LIMIT CONCENTRATION ON WORK OR DAILY ACTIVITY: SOMETIMES

## 2025-05-25 ASSESSMENT — MIGRAINE DISABILITY ASSESSMENT (MIDAS)
HOW MANY DAYS IN THE PAST 3 MONTHS HAVE YOU HAD A HEADACHE: 80
HOW MANY DAYS WAS YOUR PRODUCTIVITY CUT IN HALF BECAUSE OF HEADACHES: 10
HOW MANY DAYS DID YOU MISS WORK OR SCHOOL BECAUSE OF HEADACHES: 2
HOW MANY DAYS WAS HOUSEWORK PRODUCTIVITY CUT IN HALF DUE TO HEADACHES: 30
HOW OFTEN WERE SOCIAL ACTIVITIES MISSED DUE TO HEADACHES: 1
HOW MANY DAYS DID YOU NOT DO HOUSEWORK BECAUSE OF HEADACHES: 10
ON A SCALE FROM 0-10 ON AVERAGE HOW PAINFUL WERE HEADACHES: 4
TOTAL SCORE: 53

## 2025-05-28 ENCOUNTER — OFFICE VISIT (OUTPATIENT)
Dept: NEUROLOGY | Facility: CLINIC | Age: 50
End: 2025-05-28
Attending: NURSE PRACTITIONER
Payer: COMMERCIAL

## 2025-05-28 ENCOUNTER — PRE VISIT (OUTPATIENT)
Dept: NEUROLOGY | Facility: CLINIC | Age: 50
End: 2025-05-28

## 2025-05-28 VITALS — HEART RATE: 91 BPM | OXYGEN SATURATION: 98 % | SYSTOLIC BLOOD PRESSURE: 131 MMHG | DIASTOLIC BLOOD PRESSURE: 71 MMHG

## 2025-05-28 DIAGNOSIS — G43.709 CHRONIC MIGRAINE WITHOUT AURA WITHOUT STATUS MIGRAINOSUS, NOT INTRACTABLE: Primary | ICD-10-CM

## 2025-05-28 DIAGNOSIS — G44.40 ANALGESIC OVERUSE HEADACHE: ICD-10-CM

## 2025-05-28 DIAGNOSIS — Z86.69 HISTORY OF MIGRAINE: ICD-10-CM

## 2025-05-28 DIAGNOSIS — T39.95XA ANALGESIC OVERUSE HEADACHE: ICD-10-CM

## 2025-05-28 RX ORDER — SUMATRIPTAN SUCCINATE 100 MG/1
100 TABLET ORAL
Qty: 18 TABLET | Refills: 11 | Status: SHIPPED | OUTPATIENT
Start: 2025-05-28

## 2025-05-28 RX ORDER — PROPRANOLOL HYDROCHLORIDE 10 MG/1
10 TABLET ORAL 2 TIMES DAILY
Qty: 30 TABLET | Refills: 0 | Status: SHIPPED | OUTPATIENT
Start: 2025-05-28

## 2025-05-28 NOTE — LETTER
5/28/2025      Carri Estrada  [address]  [place] [address]      Dear Colleague,    Thank you for referring your patient, Carri Estrada, to the [facility]. Please see a copy of my visit note below.    [facility]   Headache Neurology Consult    May 28, 2025     Carri Estrada MRN# [id number]   YOB: 1975 Age: 50 year old     Referring provider: Selena Altamirano         Assessment and Recommendations:     Carri Estrada is a 50 year old female who presents for further evaluation of headaches.     Her headache presentation meets criteria for chronic migraine without aura, complicated by medication overuse headache.     Her neurologic examination is overall intact today. Further evaluation for secondary causes of headache or neuroimaging is not needed at this time.     We discussed the following treatment strategy:  - Risk of analgesic overuse headache reviewed.   - For acute treatment of mild headache, she may use Excedrin as needed, though she will continue to reduce use of this medication. Ideally, Excedrin use would be limited to up to 9 days per month to avoid medication overuse. Advise she work to reduce Excedrin very gradually.   - For acute treatment of moderate to severe headache, she may use sumatriptan 25-50 mg taken at onset of headache with a repeat dose taken after 2 hours if needed. Use should not exceed 9 days per month to avoid medication overuse.     Her current frequency and severity of headaches warrant prevention.  - She was previously prescribed propranolol but was hesitant to try out of concern for side effects. She is willing to try a low dose to start. Will trial propranolol 10 mg twice daily, and can increase dose as needed and as tolerated. Side effects reviewed.   - She does not want anything that is sedating so would favor nortriptyline or protriptyline, not keen on  trying topiramate.     The longitudinal plan of care for the diagnosis(es)/condition(s) as documented were addressed during this visit. Due to the added complexity in care, I will continue to support Carri in the subsequent management and with ongoing continuity of care.     Follow up in 3 months.     Tiara Holguin PA-C  Headache Neurology  [facility]            Chief Complaint:     Chief Complaint   Patient presents with     Headache           History is obtained from the patient and medical record.      Carri Estrada is a 50 year old female who presents for further evaluation of headaches.     She reports a history of migraine headaches since age 14 or so. Frequency has increased over the past few years.     Headaches start as pain at the back of her neck, and can be bilateral retro-orbital pain and sinus congestion when more severe. It is constant throbbing pain. Headaches can become quite severe and can last up to 3 days in duration. She can have associated nausea, vomiting. She sometimes can have photophobia, phonophobia.   Denies typical aura or other visual disturbances, focal paresthesias or weakness, unilateral autonomic features, vertigo, positional or exertional components.     She currently reports 30/30 headache days per month with 15/30 severe headache days (migraine) days per month.     For several years, she has been taking Excedrin daily, sometimes up to 2-3 times daily. She notes decreased effectiveness of this medication over time. She will sometimes also use ibuprofen.   She has tried sumatriptan 25-50 mg and this is effective. If she takes a higher dose (like 75 mg) she can feel some upper extremity pressure and and cognitive fuzziness. She is needing to take sumatriptan 15 days of the month.     She maintains adequate hydration, tries to get enough rest. Struggling with insomnia recently.     She has always though migraines were hormonal. Has an IUD which she finds  beneficial. Notes her insomnia is worse every 2 weeks. Likely has a 23-day cycle based on her tracking. Gets more stretches of migraine (week at a time) likely with ovulation and what would be her menses (does not bleed with IUD in place).     Notes more recent mental changes, weight changes. Feels like she has been in perimenopause for 10 years. She had hot flashes in her 30s.     Other migraine triggers can include artifical sweeteners, sleep changes, a little too much sugar, if evening meals are too carb-heavy.     Coffee in the morning. Too much or too little caffeine can provoke migraine.     No prior history of head injuries.     Strong family history of migraines on both paternal and maternal sides.      Current on eye exams.     She started a new job in August. She works in healthcare administration in billing.    She will need to miss work about 2-3 times per year due to migraine, but often pushes through symptoms.     She was prescribed propranolol 60 mg daily for migraine prophylaxis but did not start this due to concern for side effects. She also has a history of asthma.     She has previously tried topiramate but did not tolerate as this worsened depression.     Takes 0.25 tablets of cetirizine daily. Notes she gets hives when she stops this.         5/25/2025    10:06 AM   HIT-6   When you have headaches, how often is the pain severe 10   How often do headaches limit your ability to do usual daily activities including household work, work, school, or social activities? 10   When you have a headache, how often do you wish you could lie down? 11   In the past 4 weeks, how often have you felt too tired to do work or daily activities because of your headaches 10   In the past 4 weeks, how often have you felt fed up or irritated because of your headaches 10   In the past 4 weeks, how often did headaches limit your ability to concentrate on work or daily activities 10   HIT-6 Total Score 61         Patient-reported           2025    10:18 AM   MIDAS - in the past three months:   On how many days did you miss work or school because of your headaches? 2   How many days was your productivity at work or school reduced by half or more because of your headaches? 10   On how many days did you not do household work because of your headaches? 10   How many days was your productivity in household work reduced by half or more because of your headaches? 30   On how many days did you miss family, social, or leisure activities because of your headaches? 1   On how many days did you have a headache? 80   On a scale of 0-10, on average how painful were these headaches? 4   MIDAS Score 53 (IV - Severe Disability)               Past Medical History:     Past Medical History:   Diagnosis Date     Complication of anesthesia      Depressive disorder     Seasonal     PONV (postoperative nausea and vomiting)      Uncomplicated asthma when i was young              Past Surgical History:     Past Surgical History:   Procedure Laterality Date     COLONOSCOPY N/A 2024    Procedure: Colonoscopy;  Surgeon: Edin Freeman MD;  Location: [facility]     VAGINA RECONSTRUCTION SURGERY               Social History:     Social History     Socioeconomic History     Marital status:      Spouse name: Not on file     Number of children: Not on file     Years of education: Not on file     Highest education level: Associate degree: occupational, technical, or vocational program   Occupational History     Not on file   Tobacco Use     Smoking status: Former     Current packs/day: 0.00     Average packs/day: 0.5 packs/day for 35.0 years (17.5 ttl pk-yrs)     Types: Cigarettes     Start date: 1983     Quit date: 2018     Years since quittin.5     Smokeless tobacco: Never   Vaping Use     Vaping status: Never Used   Substance and Sexual Activity     Alcohol use: Not Currently     Drug use: Yes     Types: Marijuana      Comment: Gummies - most nights     Sexual activity: Yes     Partners: Male     Birth control/protection: None, I.U.D.     Comment: Mirena 11/15/2018   Other Topics Concern     Parent/sibling w/ CABG, MI or angioplasty before 65F 55M? Not Asked   Social History Narrative     Not on file     Social Drivers of Health     Financial Resource Strain: High Risk (7/5/2023)    Overall Financial Resource Strain (CARDIA)      Difficulty of Paying Living Expenses: Hard   Food Insecurity: Food Insecurity Present (7/5/2023)    Hunger Vital Sign      Worried About Running Out of Food in the Last Year: Sometimes true      Ran Out of Food in the Last Year: Sometimes true   Transportation Needs: No Transportation Needs (7/5/2023)    PRAPARE - Transportation      Lack of Transportation (Medical): No      Lack of Transportation (Non-Medical): No   Physical Activity: Insufficiently Active (7/5/2023)    Exercise Vital Sign      Days of Exercise per Week: 1 day      Minutes of Exercise per Session: 30 min   Stress: Stress Concern Present (7/5/2023)    [employer] - Occupational Stress Questionnaire      Feeling of Stress : To some extent   Social Connections: Socially Isolated (7/5/2023)    Social Connection and Isolation Panel [NHANES]      Frequency of Communication with Friends and Family: Once a week      Frequency of Social Gatherings with Friends and Family: Never      Attends Taoism Services: Never      Active Member of Clubs or Organizations: No      Attends Club or Organization Meetings: Not on file      Marital Status:    Interpersonal Safety: Not on file   Housing Stability: High Risk (7/5/2023)    Housing Stability Vital Sign      Unable to Pay for Housing in the Last Year: Yes      Number of Places Lived in the Last Year: 1      Unstable Housing in the Last Year: No             Family History:     Family History   Problem Relation Age of Onset     Cancer Mother      Anxiety Disorder  Mother      Depression Mother      Obesity Mother      Other Cancer Mother      Myocardial Infarction Father      Anxiety Disorder Father      Depression Father      Hypertension Father      Obesity Father      Hyperlipidemia Father      Heart Disease Father      Breast Cancer Paternal Grandmother      Obesity Paternal Grandmother      Alzheimer Disease Paternal Grandmother      Heart Disease Paternal Grandfather      Family History Negative Brother      Anxiety Disorder Brother      Depression Brother      Family History Negative Sister      Ulcerative Colitis Son      Cerebrovascular Disease No family hx of      Colon Cancer No family hx of              Allergies:     Allergies   Allergen Reactions     Fennel (Foeniculum Vulgaris) Swelling     No Clinical Screening - See Comments      Other reaction(s): Edema     Topamax [Topiramate] Other (See Comments)     Severe depression     Tree Nuts [Nuts]              Medications:       Current Outpatient Medications:      albuterol (PROAIR HFA/PROVENTIL HFA/VENTOLIN HFA) 108 (90 Base) MCG/ACT inhaler, Inhale 2 puffs into the lungs every 4 hours as needed for shortness of breath or wheezing, Disp: 16 g, Rfl: 11     Aspirin-Acetaminophen-Caffeine (EXCEDRIN PO), Take 600 mg by mouth daily, Disp: , Rfl:      cetirizine (ZYRTEC) 2.5 mg half-tab, Take 2.5 mg by mouth daily, Disp: , Rfl:      levonorgestrel (MIRENA) 20 MCG/24HR IUD, 1 each (20 mcg) by Intrauterine route continuous, Disp: , Rfl:      propranolol ER (INDERAL LA) 60 MG 24 hr capsule, Take 1 capsule (60 mg) by mouth daily., Disp: 90 capsule, Rfl: 3     SUMAtriptan (IMITREX) 100 MG tablet, Take 1 tablet (100 mg) by mouth at onset of headache for migraine. May repeat in 2 hours. Max 4 tablets/24 hours., Disp: 18 tablet, Rfl: 0          Physical Exam:   /71   Pulse 91   SpO2 98%      General: In no acute distress.  Head: Normocephalic, atraumatic. No radiating pain with palpation over the supraorbital notches,  occipital nerves. Temporal pulses intact.   Neck: Normal range of motion with lateral head movements and neck flexion.  Eyes: No conjunctival injection, no scleral icterus.     Neurologic Exam:  Mental Status Exam: Alert, awake and oriented to situation. No dysarthria. Speech of normal fluency.  Cranial Nerves: Fundoscopic exam with clear disc margins bilaterally. PERRLA, EOMs intact, no nystagmus, facial movements symmetric, facial sensation intact to light touch, hearing intact to conversation, trapezius and SCMs 5/5 bilaterally, tongue midline and fully mobile. No tongue atrophy or fasciculations.   Motor: Normal tone in all four extremities, no atrophy or fasciculations. 5/5 strength bilaterally in shoulder abduction, elbow flexion and extension, wrist flexion and extension, hip flexion, knee flexion and extension, dorsiflexion and plantarflexion. No tremors or abnormal movements noted.  Sensory: Sensation intact to light touch on arms and legs bilaterally.   Coordination: Finger-nose-finger intact bilaterally. Rapidly alternating movements intact bilaterally in the upper extremities. Normal finger tapping bilaterally. Normal Romberg.  Reflexes: 2+ and symmetric in triceps, biceps, brachioradialis, patellar, and Achilles. Plantar reflexes are downgoing bilaterally.  Gait: Normal gait. Able to toe and heel walk. Normal tandem gait.          Again, thank you for allowing me to participate in the care of your patient.        Sincerely,        Tiara Holguin PA-C    Electronically signed

## 2025-05-28 NOTE — PROGRESS NOTES
[facility]   Headache Neurology Consult    May 28, 2025     Carri Estrada MRN# [id number]   YOB: 1975 Age: 50 year old     Referring provider: Selena Alatmirano         Assessment and Recommendations:     Carri Estrada is a 50 year old female who presents for further evaluation of headaches.     Her headache presentation meets criteria for chronic migraine without aura, complicated by medication overuse headache.     Her neurologic examination is overall intact today. Further evaluation for secondary causes of headache or neuroimaging is not needed at this time.     We discussed the following treatment strategy:  - Risk of analgesic overuse headache reviewed.   - For acute treatment of mild headache, she may use Excedrin as needed, though she will continue to reduce use of this medication. Ideally, Excedrin use would be limited to up to 9 days per month to avoid medication overuse. Advise she work to reduce Excedrin very gradually.   - For acute treatment of moderate to severe headache, she may use sumatriptan 25-50 mg taken at onset of headache with a repeat dose taken after 2 hours if needed. Use should not exceed 9 days per month to avoid medication overuse.     Her current frequency and severity of headaches warrant prevention.  - She was previously prescribed propranolol but was hesitant to try out of concern for side effects. She is willing to try a low dose to start. Will trial propranolol 10 mg twice daily, and can increase dose as needed and as tolerated. Side effects reviewed.   - She does not want anything that is sedating so would favor nortriptyline or protriptyline, not keen on trying topiramate.     The longitudinal plan of care for the diagnosis(es)/condition(s) as documented were addressed during this visit. Due to the added complexity in care, I will continue to support Carri in the subsequent management and with ongoing continuity of  care.     Follow up in 3 months.     Tiara Holguin PA-C  Headache Neurology  [facility]            Chief Complaint:     Chief Complaint   Patient presents with    Headache           History is obtained from the patient and medical record.      Carri Estrada is a 50 year old female who presents for further evaluation of headaches.     She reports a history of migraine headaches since age 14 or so. Frequency has increased over the past few years.     Headaches start as pain at the back of her neck, and can be bilateral retro-orbital pain and sinus congestion when more severe. It is constant throbbing pain. Headaches can become quite severe and can last up to 3 days in duration. She can have associated nausea, vomiting. She sometimes can have photophobia, phonophobia.   Denies typical aura or other visual disturbances, focal paresthesias or weakness, unilateral autonomic features, vertigo, positional or exertional components.     She currently reports 30/30 headache days per month with 15/30 severe headache days (migraine) days per month.     For several years, she has been taking Excedrin daily, sometimes up to 2-3 times daily. She notes decreased effectiveness of this medication over time. She will sometimes also use ibuprofen.   She has tried sumatriptan 25-50 mg and this is effective. If she takes a higher dose (like 75 mg) she can feel some upper extremity pressure and and cognitive fuzziness. She is needing to take sumatriptan 15 days of the month.     She maintains adequate hydration, tries to get enough rest. Struggling with insomnia recently.     She has always though migraines were hormonal. Has an IUD which she finds beneficial. Notes her insomnia is worse every 2 weeks. Likely has a 23-day cycle based on her tracking. Gets more stretches of migraine (week at a time) likely with ovulation and what would be her menses (does not bleed with IUD in place).     Notes more recent mental  changes, weight changes. Feels like she has been in perimenopause for 10 years. She had hot flashes in her 30s.     Other migraine triggers can include artifical sweeteners, sleep changes, a little too much sugar, if evening meals are too carb-heavy.     Coffee in the morning. Too much or too little caffeine can provoke migraine.     No prior history of head injuries.     Strong family history of migraines on both paternal and maternal sides.      Current on eye exams.     She started a new job in August. 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    She will need to miss work about 2-3 times per year due to migraine, but often pushes through symptoms.     She was prescribed propranolol 60 mg daily for migraine prophylaxis but did not start this due to concern for side effects. She also has a history of asthma.     She has previously tried topiramate but did not tolerate as this worsened depression.     Takes 0.25 tablets of cetirizine daily. Notes she gets hives when she stops this.         5/25/2025    10:06 AM   HIT-6   When you have headaches, how often is the pain severe 10   How often do headaches limit your ability to do usual daily activities including household work, work, school, or social activities? 10   When you have a headache, how often do you wish you could lie down? 11   In the past 4 weeks, how often have you felt too tired to do work or daily activities because of your headaches 10   In the past 4 weeks, how often have you felt fed up or irritated because of your headaches 10   In the past 4 weeks, how often did headaches limit your ability to concentrate on work or daily activities 10   HIT-6 Total Score 61        Patient-reported           5/25/2025    10:18 AM   MIDAS - in the past three months:   On how many days did you miss work or school because of your headaches? 2   How many days was your productivity at work or school reduced by half or more because of your headaches? 10   On how  many days did you not do household work because of your headaches? 10   How many days was your productivity in household work reduced by half or more because of your headaches? 30   On how many days did you miss family, social, or leisure activities because of your headaches? 1   On how many days did you have a headache? 80   On a scale of 0-10, on average how painful were these headaches? 4   MIDAS Score 53 (IV - Severe Disability)               Past Medical History:     Past Medical History:   Diagnosis Date    Complication of anesthesia     Depressive disorder     Seasonal    PONV (postoperative nausea and vomiting)     Uncomplicated asthma when i was young              Past Surgical History:     Past Surgical History:   Procedure Laterality Date    COLONOSCOPY N/A 2024    Procedure: Colonoscopy;  Surgeon: Edin Freeman MD;  Location: [facility]    VAGINA RECONSTRUCTION SURGERY               Social History:     Social History     Socioeconomic History    Marital status:      Spouse name: Not on file    Number of children: Not on file    Years of education: Not on file    Highest education level: Associate degree: occupational, technical, or vocational program   Occupational History    Not on file   Tobacco Use    Smoking status: Former     Current packs/day: 0.00     Average packs/day: 0.5 packs/day for 35.0 years (17.5 ttl pk-yrs)     Types: Cigarettes     Start date: 1983     Quit date: 2018     Years since quittin.5    Smokeless tobacco: Never   Vaping Use    Vaping status: Never Used   Substance and Sexual Activity    Alcohol use: Not Currently    Drug use: Yes     Types: Marijuana     Comment: Gummies - most nights    Sexual activity: Yes     Partners: Male     Birth control/protection: None, I.U.D.     Comment: Mirena 11/15/2018   Other Topics Concern    Parent/sibling w/ CABG, MI or angioplasty before 65F 55M? Not Asked   Social History Narrative    Not on file     Social  Drivers of Health     Financial Resource Strain: High Risk (7/5/2023)    Overall Financial Resource Strain (CARDIA)     Difficulty of Paying Living Expenses: Hard   Food Insecurity: Food Insecurity Present (7/5/2023)    Hunger Vital Sign     Worried About Running Out of Food in the Last Year: Sometimes true     Ran Out of Food in the Last Year: Sometimes true   Transportation Needs: No Transportation Needs (7/5/2023)    PRAPARE - Transportation     Lack of Transportation (Medical): No     Lack of Transportation (Non-Medical): No   Physical Activity: Insufficiently Active (7/5/2023)    Exercise Vital Sign     Days of Exercise per Week: 1 day     Minutes of Exercise per Session: 30 min   Stress: Stress Concern Present (7/5/2023)    [employer] - Occupational Stress Questionnaire     Feeling of Stress : To some extent   Social Connections: Socially Isolated (7/5/2023)    Social Connection and Isolation Panel [NHANES]     Frequency of Communication with Friends and Family: Once a week     Frequency of Social Gatherings with Friends and Family: Never     Attends Roman [employer] Services: Never     Active Member of Clubs or Organizations: No     Attends Club or Organization Meetings: Not on file     Marital Status:    Interpersonal Safety: Not on file   Housing Stability: High Risk (7/5/2023)    Housing Stability Vital Sign     Unable to Pay for Housing in the Last Year: Yes     Number of Places Lived in the Last Year: 1     Unstable Housing in the Last Year: No             Family History:     Family History   Problem Relation Age of Onset    Cancer Mother     Anxiety Disorder Mother     Depression Mother     Obesity Mother     Other Cancer Mother     Myocardial Infarction Father     Anxiety Disorder Father     Depression Father     Hypertension Father     Obesity Father     Hyperlipidemia Father     Heart Disease Father     Breast Cancer Paternal Grandmother     Obesity Paternal Grandmother      Alzheimer Disease Paternal Grandmother     Heart Disease Paternal Grandfather     Family History Negative Brother     Anxiety Disorder Brother     Depression Brother     Family History Negative Sister     Ulcerative Colitis Son     Cerebrovascular Disease No family hx of     Colon Cancer No family hx of              Allergies:     Allergies   Allergen Reactions    Fennel (Foeniculum Vulgaris) Swelling    No Clinical Screening - See Comments      Other reaction(s): Edema    Topamax [Topiramate] Other (See Comments)     Severe depression    Tree Nuts [Nuts]              Medications:       Current Outpatient Medications:     albuterol (PROAIR HFA/PROVENTIL HFA/VENTOLIN HFA) 108 (90 Base) MCG/ACT inhaler, Inhale 2 puffs into the lungs every 4 hours as needed for shortness of breath or wheezing, Disp: 16 g, Rfl: 11    Aspirin-Acetaminophen-Caffeine (EXCEDRIN PO), Take 600 mg by mouth daily, Disp: , Rfl:     cetirizine (ZYRTEC) 2.5 mg half-tab, Take 2.5 mg by mouth daily, Disp: , Rfl:     levonorgestrel (MIRENA) 20 MCG/24HR IUD, 1 each (20 mcg) by Intrauterine route continuous, Disp: , Rfl:     propranolol ER (INDERAL LA) 60 MG 24 hr capsule, Take 1 capsule (60 mg) by mouth daily., Disp: 90 capsule, Rfl: 3    SUMAtriptan (IMITREX) 100 MG tablet, Take 1 tablet (100 mg) by mouth at onset of headache for migraine. May repeat in 2 hours. Max 4 tablets/24 hours., Disp: 18 tablet, Rfl: 0          Physical Exam:   /71   Pulse 91   SpO2 98%      General: In no acute distress.  Head: Normocephalic, atraumatic. No radiating pain with palpation over the supraorbital notches, occipital nerves. Temporal pulses intact.   Neck: Normal range of motion with lateral head movements and neck flexion.  Eyes: No conjunctival injection, no scleral icterus.     Neurologic Exam:  Mental Status Exam: Alert, awake and oriented to situation. No dysarthria. Speech of normal fluency.  Cranial Nerves: Fundoscopic exam with clear disc margins  bilaterally. PERRLA, EOMs intact, no nystagmus, facial movements symmetric, facial sensation intact to light touch, hearing intact to conversation, trapezius and SCMs 5/5 bilaterally, tongue midline and fully mobile. No tongue atrophy or fasciculations.   Motor: Normal tone in all four extremities, no atrophy or fasciculations. 5/5 strength bilaterally in shoulder abduction, elbow flexion and extension, wrist flexion and extension, hip flexion, knee flexion and extension, dorsiflexion and plantarflexion. No tremors or abnormal movements noted.  Sensory: Sensation intact to light touch on arms and legs bilaterally.   Coordination: Finger-nose-finger intact bilaterally. Rapidly alternating movements intact bilaterally in the upper extremities. Normal finger tapping bilaterally. Normal Romberg.  Reflexes: 2+ and symmetric in triceps, biceps, brachioradialis, patellar, and Achilles. Plantar reflexes are downgoing bilaterally.  Gait: Normal gait. Able to toe and heel walk. Normal tandem gait.

## 2025-05-28 NOTE — PATIENT INSTRUCTIONS
Try propranolol 10 mg. Take 1 tablet once daily for 1-2 weeks then if tolerating okay, increase to 10 mg twice daily.   Let me know via [employer] message if this isn't working out or if you want to continue with the medication.     Try to reduce Excedrin to no more than 9-10 days per month.   Try to reduce Sumatriptan use to no more than 9 days per month.

## 2025-06-02 ENCOUNTER — VIRTUAL VISIT (OUTPATIENT)
Dept: PEDIATRICS | Facility: CLINIC | Age: 50
End: 2025-06-02
Payer: COMMERCIAL

## 2025-06-02 DIAGNOSIS — Z12.31 VISIT FOR SCREENING MAMMOGRAM: ICD-10-CM

## 2025-06-02 DIAGNOSIS — Z87.19 HISTORY OF ACUTE PANCREATITIS: ICD-10-CM

## 2025-06-02 DIAGNOSIS — E66.01 MORBID OBESITY (H): Primary | ICD-10-CM

## 2025-06-02 DIAGNOSIS — E78.5 HYPERLIPIDEMIA LDL GOAL <100: ICD-10-CM

## 2025-06-02 PROCEDURE — 98006 SYNCH AUDIO-VIDEO EST MOD 30: CPT | Performed by: NURSE PRACTITIONER

## 2025-06-02 NOTE — PROGRESS NOTES
"Carri is a 50 year old who is being evaluated via a billable video visit.          Assessment & Plan     Morbid obesity (H)  Hyperlipidemia LDL goal <100  History of acute pancreatitis  Pt wanting to start GLP-1 therapy for obesity but insurance does not cover. Has had side effects with wellbutrin (headache), topiramate (depression), phentermine (insomnia, constipation), naltrexone (fatigue). Had a questionable bout of pancreatitis so unclear if she's a candidate for GLP-1  -Referral to GI to get their opinion on whether this would be an option for her given history of possible panreatitis  - Adult GI  Referral - Consult Only; Future  -If they give the ok, I will send in zofran plus zepbound 0.25mg to [facility] direct. Reviewed r/b/se and cost    Visit for screening mammogram  - MA Screen Bilateral w/Umair; Future      BMI  Estimated body mass index is 40.77 kg/m  as calculated from the following:    Height as of 11/11/24: 1.651 m (5' 5\").    Weight as of 11/11/24: 111.1 kg (245 lb).   Weight management plan: Discussed healthy diet and exercise guidelines          Subjective   Weight 255#  Did weight watchers 2 times, myfitness pal. Not able to say no to food.     Has been on phentermine-sleep, constipation issues    Contrave-headaches.     Recently started propranolol for headaches. Maybe feels calmer. Hasn't helped with headaches yet. Has only been 3 days.     Carri is a 50 year old, presenting for the following health issues:  No chief complaint on file.      Via the Health Maintenance questionnaire, the patient has reported the following services have been completed -Mammogram: [place] 2023-08-01, this information has not been sent to the abstraction team.  HPI              Review of Systems  Constitutional, HEENT, cardiovascular, pulmonary, GI, , musculoskeletal, neuro, skin, endocrine and psych systems are negative, except as otherwise noted.      Objective         Vitals:  No vitals were obtained " today due to virtual visit.    Physical Exam   GENERAL: alert and no distress  EYES: Eyes grossly normal to inspection.  No discharge or erythema, or obvious scleral/conjunctival abnormalities.  RESP: No audible wheeze, cough, or visible cyanosis.    SKIN: Visible skin clear. No significant rash, abnormal pigmentation or lesions.  NEURO: Cranial nerves grossly intact.  Mentation and speech appropriate for age.  PSYCH: Appropriate affect, tone, and pace of words          Video-Visit Details    Type of service:  Video Visit   Originating Location (pt. Location): Home    Distant Location (provider location):  Off-site  Platform used for Video Visit: Vanita  Signed Electronically by: WARD MEDELLIN CNP

## 2025-06-04 ENCOUNTER — PATIENT OUTREACH (OUTPATIENT)
Dept: CARE COORDINATION | Facility: CLINIC | Age: 50
End: 2025-06-04
Payer: COMMERCIAL

## 2025-06-17 ENCOUNTER — ANCILLARY PROCEDURE (OUTPATIENT)
Dept: MAMMOGRAPHY | Facility: CLINIC | Age: 50
End: 2025-06-17
Attending: NURSE PRACTITIONER
Payer: COMMERCIAL

## 2025-06-17 DIAGNOSIS — Z12.31 VISIT FOR SCREENING MAMMOGRAM: ICD-10-CM

## 2025-06-17 PROCEDURE — 77063 BREAST TOMOSYNTHESIS BI: CPT | Mod: TC | Performed by: RADIOLOGY

## 2025-06-17 PROCEDURE — 77067 SCR MAMMO BI INCL CAD: CPT | Mod: TC | Performed by: RADIOLOGY

## 2025-06-25 ENCOUNTER — VIRTUAL VISIT (OUTPATIENT)
Dept: GASTROENTEROLOGY | Facility: CLINIC | Age: 50
End: 2025-06-25
Attending: NURSE PRACTITIONER
Payer: COMMERCIAL

## 2025-06-25 DIAGNOSIS — Z87.19 HISTORY OF ACUTE PANCREATITIS: ICD-10-CM

## 2025-06-25 DIAGNOSIS — E66.812 OBESITY, CLASS II, BMI 35-39.9: Primary | ICD-10-CM

## 2025-06-25 PROCEDURE — 98001 SYNCH AUDIO-VIDEO NEW LOW 30: CPT | Performed by: INTERNAL MEDICINE

## 2025-06-25 PROCEDURE — 1125F AMNT PAIN NOTED PAIN PRSNT: CPT | Mod: 95 | Performed by: INTERNAL MEDICINE

## 2025-06-25 NOTE — PATIENT INSTRUCTIONS
Follow up:    Dr. Martin has outlined the following steps after your recent clinic visit:    1_) fasting lipid profile per primary     2) eus at [facility] if available, no urgency but before considering GLP1 (this procedure can be discussed with Dr Mercedes with our bariatric cilnic._     3) This note to update PCP re risk of GLP1s w pancreatitis history    4) Referral to  [facility] bariatric clinic. Scheduling will reach out      Please call with any questions or concerns regarding your clinic visit today.    It is a pleasure being involved in your health care.    Contacts post-consultation depending on your need:    Schedule Clinic Appointments            [phone] # 1   M-F 7:30 - 5 pm    Yaneth Packer, RN Care Coordinator (Dr. Wilhelm/Dr. Martin)    [phone]    Diana Fu, RN Care Coordinator (Dr. Jesus/Dr. Mercedes)                            [phone]    Ann Francis, RN Care Coordinator (Dr. Salinas/Dr. Hollingsworth)    [phone]    OMAR Hanson  [phone]    [facility]  (all MDs)     [phone]      For urgent/emergent questions after business hours, you may reach the on-call GI Fellow by contacting the [facility]  at [phone].    How do I schedule labs, imaging studies, or procedures that were ordered in clinic today?     Labs: To schedule lab appointment at the [facility], use my chart or call [phone]. If you have a [place] lab closer to home where you are regularly seen you can give them a call.     Procedures: If a colonoscopy, upper endoscopy, breath test, esophageal manometry, or pH impedence was ordered today, our endoscopy team will call you to schedule this. If you have not heard from our endoscopy team within a week, please call [phone] to schedule.     Imaging Studies: If you were scheduled for a CT scan, X-ray, MRI, ultrasound, HIDA scan or other imaging study, please call [phone] to have  this scheduled.     Referral: If a referral to another specialty was ordered, expect a phone call or follow instructions above. If you have not heard from anyone regarding your referral in a week, please call our clinic to check the status.     How to I schedule a follow-up visit?  If you did not schedule a follow-up visit today, please call [phone] to schedule a follow-up office visit.

## 2025-06-25 NOTE — PROGRESS NOTES
Carri is a very pleasant 50-year-old referred by her PCP Selena Altamirano CNP for evaluation of single episode of pancreatitis and safety of starting GLP-1's.  Patient's generally been in good health other than asthma headaches and now obesity.  In 2023 she had an episode of escalating abdominal pain after a period of very high fat protein diet intake which she and her  were attempting to lose weight.  At the [facility] ER if she is found to have a leukocytosis normal lipase but a CT scan that cyst suggested mild interstitial pancreatitis.  We have those images available to review and I agree that there is haziness around the pancreas.  She had normal liver enzymes and a negative right upper quadrant ultrasound. That pain subsided and has not recurred.  Triglycerides were not checked on that admission and calcium has been normal.  She did no significant alcohol intake at that time.    The main focus of the visit was whether it is safe to start GLP-1's.  We had an extensive discussion about that it is at as it is an ongoing issue with the evolving understanding.  To summarize GLP-1's in patients without a history of pancreatitis have not shown increased incidence in double-blind controlled trials.  Most pancreatitis related to GLP-1's is thought to be related to rapid weight loss with formation of gallstones.  More contentious is whether they are safe in patients with a prior history of pancreatitis.  Anecdotally we have seen a number of patients with a clear history of recurrent acute or chronic pancreatitis were starting a GLP-1 triggered a relapse.  However increasing data suggest that they may not flare pancreatitis and more recently paper presented at a national meeting suggested that they may actually be protective.  However we still advised patients with a history of pancreatitis to be very cautious about considering GLP-1's    Nonetheless we talked about the fact that she may want to consider broader  bariatric options.  I have great confidence in our bariatric clinic at the [facility] as it encompasses every available therapy including endoscopic bariatric therapy that focuses on endoscopic sleeve gastropexy to reduce the volume of the stomach with an outpatient endoscopic procedure with no incisions and is reversible.  We are fortunate to have a nationally certified expert in that aspect of bariatrics in our division who is part of our integrated multidisciplinary bariatrics program.  After opening that possibility the patient actually would like to see our bariatric clinic    I do think that with a single episode of pancreatitis it warrants an endoscopic ultrasound and particularly to look for subtle gallbladder sludge or pathology that can be missed by right upper quadrant ultrasound and/or liver tests.    Once that is done she can follow-up with her PCP    1_) fasting lipid profile per primary   2) eus at [facility] if available, no urgency but before considering GLP1  3) This note to update PCP re risk of GLP1s w pancreatitis history  4) Referral to  [facility] bariatric clinic.    Oren Martin MD  Gastroenterology, Pancreas and Biliary Disorders  [facility]      Joined the call at 6/25/2025, 9:39:37 am.  Left the call at 6/25/2025, 10:06:13 am.  You were on the call for 26 minutes 36 seconds.     Latest Reference Range & Units Most Recent 11/08/23 12:21   Lipase 13 - 60 U/L 59  11/8/23 12:21 59         Study Result    Narrative & Impression   CT ABDOMEN PELVIS W CONTRAST 11/8/2023 3:54 PM     CLINICAL HISTORY: abd pain, fever, nausea, diarrhea, leukocytosis     TECHNIQUE: CT scan of the abdomen and pelvis was performed following  injection of IV contrast. Multiplanar reformats were obtained. Dose  reduction techniques were used.  CONTRAST: 100mL Isovue-370     COMPARISON: Same day ultrasound     FINDINGS:   LOWER CHEST: Unremarkable.     HEPATOBILIARY: Normal.     PANCREAS: Possible mild  fat stranding adjacent to the pancreas. No  ductal dilation or parenchymal hypoenhancement.     SPLEEN: Normal.     ADRENAL GLANDS: Normal.     KIDNEYS/BLADDER: No hydronephrosis. Urinary bladder is decompressed  but otherwise unremarkable.     BOWEL: No obstruction or inflammatory change. Normal appendix.     PELVIC ORGANS: IUD in appropriate position.     ADDITIONAL FINDINGS: None.     MUSCULOSKELETAL: No acute bony abnormality.                                                                      IMPRESSION:   1.  Possible changes of mild acute interstitial pancreatitis,  recommend correlation with lipase.  2.  No additional visualized explanation for patient's symptoms.     DAMIAN JAY MD            Study Result    Narrative & Impression   US ABDOMEN LIMITED 11/8/2023 2:58 PM     CLINICAL HISTORY: upper abd pain, fever, nausea  TECHNIQUE: Limited abdominal ultrasound.     COMPARISON: None.     FINDINGS:     GALLBLADDER: The gallbladder is normal. No gallstones, wall  thickening, or pericholecystic fluid. Negative sonographic Santiago's  sign.     BILE DUCTS: There is no biliary dilatation. The common duct measures  4mm.     LIVER: Normal.     RIGHT KIDNEY: No hydronephrosis.     PANCREAS: Ill-defined echogenicity in the expected area of the  pancreas.                                                                      IMPRESSION:  1.  Normal gallbladder. No biliary ductal dilatation.  2.  Ill-defined echogenicity in the expected areas of the pancreas is  indeterminate. Consider a follow-up CT of the abdomen and pelvis for  further evaluation.     Current Outpatient Medications   Medication Sig Dispense Refill    albuterol (PROAIR HFA/PROVENTIL HFA/VENTOLIN HFA) 108 (90 Base) MCG/ACT inhaler Inhale 2 puffs into the lungs every 4 hours as needed for shortness of breath or wheezing 16 g 11    Aspirin-Acetaminophen-Caffeine (EXCEDRIN PO) Take 600 mg by mouth daily      cetirizine (ZYRTEC) 2.5 mg half-tab Take 2.5  mg by mouth daily      levonorgestrel (MIRENA) 20 MCG/24HR IUD 1 each (20 mcg) by Intrauterine route continuous      propranolol (INDERAL) 10 MG tablet Take 1 tablet (10 mg) by mouth 2 times daily. 30 tablet 0    SUMAtriptan (IMITREX) 100 MG tablet Take 1 tablet (100 mg) by mouth at onset of headache for migraine. May repeat in 2 hours. Max 4 tablets/24 hours. 18 tablet 11     No current facility-administered medications for this visit.       ED  Discharged  11/8/2023 (5 hours)  [facility] Emergency Dept     James Luna MD  Last attending  Treatment team Epigastric pain +1 more  Clinical impression Abdominal Pain  Chief complaint     ED Provider Notes  James Luna MD (Physician)  Emergency Medicine  Expand All Collapse All        History      Chief Complaint:  Abdominal Pain        CARLOS Estrada is a 48 year old female who is otherwise healthy and presents with nausea and diarrhea for the past 5 days and upper abdominal pain for the past 3 days.  The pain is constant but worsens after eating.  She also spiked a low-grade fever in the past couple of days.  She is not taking thing for the pain.  She has never had pain like this in the past.  She denies any urinary symptoms.  She denies any abdominal surgeries in the past.        Independent Historian:    None-patient only     Review of External Notes:  Reviewed nurse triage note from this morning with recommendation go to the ER.        Past Medical History:         Past Medical History:   Diagnosis Date    Depressive disorder      Uncomplicated asthma when i was young               Past Surgical History:    Past Surgical History         Past Surgical History:   Procedure Laterality Date    VAGINA RECONSTRUCTION SURGERY                   Physical Exam   Patient Vitals for the past 24 hrs:    BP Temp Temp src Pulse Resp SpO2 Height Weight   11/08/23 1650 130/80 -- -- 98 19 98 % -- --   11/08/23 1135 132/83 99  F (37.2  C) Temporal 104  "18 98 % 1.651 m (5' 5\") 108 kg (238 lb 1.6 oz)         Physical Exam  Vitals and nursing note reviewed.   Constitutional:       General: She is not in acute distress.     Appearance: She is not ill-appearing or toxic-appearing.   HENT:      Head: Normocephalic and atraumatic.      Right Ear: External ear normal.      Left Ear: External ear normal.      Nose: Nose normal.   Eyes:      Extraocular Movements: Extraocular movements intact.      Conjunctiva/sclera: Conjunctivae normal.   Cardiovascular:      Rate and Rhythm: Normal rate and regular rhythm.      Heart sounds: No murmur heard.  Pulmonary:      Effort: Pulmonary effort is normal. No respiratory distress.      Breath sounds: Normal breath sounds. No wheezing, rhonchi or rales.   Abdominal:      General: Abdomen is flat. Bowel sounds are normal. There is no distension.      Palpations: Abdomen is soft.      Tenderness: There is abdominal tenderness in the epigastric area. There is guarding. There is no rebound.   Musculoskeletal:         General: No deformity or signs of injury.      Cervical back: Normal range of motion and neck supple.   Skin:     General: Skin is warm and dry.      Findings: No rash.   Neurological:      Mental Status: She is alert and oriented to person, place, and time.   Psychiatric:         Behavior: Behavior normal.               Emergency Department Course      Imaging:  CT Abdomen Pelvis w Contrast   Final Result   IMPRESSION:    1.  Possible changes of mild acute interstitial pancreatitis,   recommend correlation with lipase.   2.  No additional visualized explanation for patient's symptoms.       DAMIAN JAY MD            SYSTEM ID:  [id number]       US Abdomen Limited (RUQ)   Final Result   IMPRESSION:   1.  Normal gallbladder. No biliary ductal dilatation.   2.  Ill-defined echogenicity in the expected areas of the pancreas is   indeterminate. Consider a follow-up CT of the abdomen and pelvis for   further evaluation.     "   ДМИТРИЙ BALL MD            SYSTEM ID:  [id number]          Report per radiology     Laboratory:        Labs Ordered and Resulted from Time of ED Arrival to Time of ED Departure   ROUTINE UA WITH MICROSCOPIC REFLEX TO CULTURE - Abnormal       Result Value      Color Urine Light Yellow        Appearance Urine Clear        Glucose Urine Negative        Bilirubin Urine Negative        Ketones Urine Negative        Specific Gravity Urine 1.013        Blood Urine Trace (*)       pH Urine 6.5        Protein Albumin Urine Negative        Urobilinogen Urine Normal        Nitrite Urine Negative        Leukocyte Esterase Urine Negative        RBC Urine 2        WBC Urine <1        Squamous Epithelials Urine 2 (*)     CBC WITH PLATELETS AND DIFFERENTIAL - Abnormal     WBC Count 13.1 (*)       RBC Count 4.49        Hemoglobin 14.0        Hematocrit 41.9        MCV 93        MCH 31.2        MCHC 33.4        RDW 12.3        Platelet Count 238        % Neutrophils 74        % Lymphocytes 11        % Monocytes 7        % Eosinophils 7        % Basophils 0        % Immature Granulocytes 1        NRBCs per 100 WBC 0        Absolute Neutrophils 9.8 (*)       Absolute Lymphocytes 1.4        Absolute Monocytes 0.9        Absolute Eosinophils 0.9 (*)       Absolute Basophils 0.0        Absolute Immature Granulocytes 0.1        Absolute NRBCs 0.0      COMPREHENSIVE METABOLIC PANEL - Normal     Sodium 136        Potassium 4.0        Carbon Dioxide (CO2) 25        Anion Gap 9        Urea Nitrogen 10.0        Creatinine 0.69        GFR Estimate >90        Calcium 9.4        Chloride 102        Glucose 84        Alkaline Phosphatase 68        AST 16        ALT 10        Protein Total 7.5        Albumin 4.3        Bilirubin Total 0.3      LIPASE - Normal     Lipase 59      HCG QUALITATIVE PREGNANCY - Normal     hCG Serum Qualitative Negative               Emergency Department Course & Assessments:              Interventions:  Medications    iopamidol (ISOVUE-370) solution 500 mL (100 mLs Intravenous $Given 23 1546)   CT scan flush (65 mLs Intravenous $Given 23 1545)         Independent Interpretation (X-rays, CTs, rhythm strip):  None     Consultations/Discussion of Management or Tests:  None     Social Determinants of Health affecting care:  None     Disposition:  The patient was discharged to home.      Impression & Plan    CMS Diagnoses: None    Medical Decision Makin-year-old female with upper abdominal pain, nausea, diarrhea.  She is quite tender in the epigastrium.  I suspect she may have cholecystitis.  Other possibilities include choledocholithiasis, hepatitis, pancreatitis, gastritis.  We will check labs and start with ultrasound.  Patient declines any pain medication.     1640    rechecked patient updated on results.  Her labs are significant for a mild leukocytosis but otherwise unremarkable.  Her ultrasound was negative for cholecystitis or choledocholithiasis.  Given the white count and her pain, a CT was then performed to rule out other etiologies.  There was some suggestion of possible mild interstitial pancreatitis, however lipase is normal.  It is possible that she could have very mild pancreatitis or gastritis or peptic ulcer.  I updated the patient on results and she continues to decline any pain medications.  She is currently on famotidine at baseline but I recommend she try a PPI and use Tums or Maalox as needed.  I will prescribe Zofran as well for nausea.  Recommend avoiding alcohol and minimizing NSAIDs for the immediate future.  We discussed return precautions and I recommended close follow-up with her primary care physician given that the diagnosis at this point is still somewhat unclear.

## 2025-06-25 NOTE — ADDENDUM NOTE
Addended by: JAMAICA ZULETA on: 6/25/2025 01:24 PM     Modules accepted: Orders     Valtrex Pregnancy And Lactation Text: this medication is Pregnancy Category B and is considered safe during pregnancy. This medication is not directly found in breast milk but it's metabolite acyclovir is present.

## 2025-06-25 NOTE — LETTER
6/25/2025      Carri Estrada  [address]  [place] [address]      Dear Colleague,    Thank you for referring your patient, Carri Estrada, to the [facility] [place]. Please see a copy of my visit note below.    Virtual Visit Details    Type of service:  Video Visit     Originating Location (pt. Location): Home    Distant Location (provider location):  On-site  Platform used for Video Visit: Vanita Christina is a very pleasant 50-year-old referred by her PCP Selena Altamirano CNP for evaluation of single episode of pancreatitis and safety of starting GLP-1's.  Patient's generally been in good health other than asthma headaches and now obesity.  In 2023 she had an episode of escalating abdominal pain after a period of very high fat protein diet intake which she and her  were attempting to lose weight.  At the [facility] ER if she is found to have a leukocytosis normal lipase but a CT scan that cyst suggested mild interstitial pancreatitis.  We have those images available to review and I agree that there is haziness around the pancreas.  She had normal liver enzymes and a negative right upper quadrant ultrasound. That pain subsided and has not recurred.  Triglycerides were not checked on that admission and calcium has been normal.  She did no significant alcohol intake at that time.    The main focus of the visit was whether it is safe to start GLP-1's.  We had an extensive discussion about that it is at as it is an ongoing issue with the evolving understanding.  To summarize GLP-1's in patients without a history of pancreatitis have not shown increased incidence in double-blind controlled trials.  Most pancreatitis related to GLP-1's is thought to be related to rapid weight loss with formation of gallstones.  More contentious is whether they are safe in patients with a prior history of pancreatitis.  Anecdotally we have seen a number of  patients with a clear history of recurrent acute or chronic pancreatitis were starting a GLP-1 triggered a relapse.  However increasing data suggest that they may not flare pancreatitis and more recently paper presented at a national meeting suggested that they may actually be protective.  However we still advised patients with a history of pancreatitis to be very cautious about considering GLP-1's    Nonetheless we talked about the fact that she may want to consider broader bariatric options.  I have great confidence in our bariatric clinic at the [facility] as it encompasses every available therapy including endoscopic bariatric therapy that focuses on endoscopic sleeve gastropexy to reduce the volume of the stomach with an outpatient endoscopic procedure with no incisions and is reversible.  We are fortunate to have a nationally certified expert in that aspect of bariatrics in our division who is part of our integrated multidisciplinary bariatrics program.  After opening that possibility the patient actually would like to see our bariatric clinic    I do think that with a single episode of pancreatitis it warrants an endoscopic ultrasound and particularly to look for subtle gallbladder sludge or pathology that can be missed by right upper quadrant ultrasound and/or liver tests.    Once that is done she can follow-up with her PCP    1_) fasting lipid profile per primary   2) eus at [facility] if available, no urgency but before considering GLP1  3) This note to update PCP re risk of GLP1s w pancreatitis history  4) Referral to  [facility] bariatric clinic.    Oren Martin MD  Gastroenterology, Pancreas and Biliary Disorders  [facility]      Joined the call at 6/25/2025, 9:39:37 am.  Left the call at 6/25/2025, 10:06:13 am.  You were on the call for 26 minutes 36 seconds.     Latest Reference Range & Units Most Recent 11/08/23 12:21   Lipase 13 - 60 U/L 59  11/8/23 12:21 59         Study  Result    Narrative & Impression   CT ABDOMEN PELVIS W CONTRAST 11/8/2023 3:54 PM     CLINICAL HISTORY: abd pain, fever, nausea, diarrhea, leukocytosis     TECHNIQUE: CT scan of the abdomen and pelvis was performed following  injection of IV contrast. Multiplanar reformats were obtained. Dose  reduction techniques were used.  CONTRAST: 100mL Isovue-370     COMPARISON: Same day ultrasound     FINDINGS:   LOWER CHEST: Unremarkable.     HEPATOBILIARY: Normal.     PANCREAS: Possible mild fat stranding adjacent to the pancreas. No  ductal dilation or parenchymal hypoenhancement.     SPLEEN: Normal.     ADRENAL GLANDS: Normal.     KIDNEYS/BLADDER: No hydronephrosis. Urinary bladder is decompressed  but otherwise unremarkable.     BOWEL: No obstruction or inflammatory change. Normal appendix.     PELVIC ORGANS: IUD in appropriate position.     ADDITIONAL FINDINGS: None.     MUSCULOSKELETAL: No acute bony abnormality.                                                                      IMPRESSION:   1.  Possible changes of mild acute interstitial pancreatitis,  recommend correlation with lipase.  2.  No additional visualized explanation for patient's symptoms.     DAMIAN JAY MD            Study Result    Narrative & Impression   US ABDOMEN LIMITED 11/8/2023 2:58 PM     CLINICAL HISTORY: upper abd pain, fever, nausea  TECHNIQUE: Limited abdominal ultrasound.     COMPARISON: None.     FINDINGS:     GALLBLADDER: The gallbladder is normal. No gallstones, wall  thickening, or pericholecystic fluid. Negative sonographic Santiago's  sign.     BILE DUCTS: There is no biliary dilatation. The common duct measures  4mm.     LIVER: Normal.     RIGHT KIDNEY: No hydronephrosis.     PANCREAS: Ill-defined echogenicity in the expected area of the  pancreas.                                                                      IMPRESSION:  1.  Normal gallbladder. No biliary ductal dilatation.  2.  Ill-defined echogenicity in the expected  areas of the pancreas is  indeterminate. Consider a follow-up CT of the abdomen and pelvis for  further evaluation.     Current Outpatient Medications   Medication Sig Dispense Refill     albuterol (PROAIR HFA/PROVENTIL HFA/VENTOLIN HFA) 108 (90 Base) MCG/ACT inhaler Inhale 2 puffs into the lungs every 4 hours as needed for shortness of breath or wheezing 16 g 11     Aspirin-Acetaminophen-Caffeine (EXCEDRIN PO) Take 600 mg by mouth daily       cetirizine (ZYRTEC) 2.5 mg half-tab Take 2.5 mg by mouth daily       levonorgestrel (MIRENA) 20 MCG/24HR IUD 1 each (20 mcg) by Intrauterine route continuous       propranolol (INDERAL) 10 MG tablet Take 1 tablet (10 mg) by mouth 2 times daily. 30 tablet 0     SUMAtriptan (IMITREX) 100 MG tablet Take 1 tablet (100 mg) by mouth at onset of headache for migraine. May repeat in 2 hours. Max 4 tablets/24 hours. 18 tablet 11     No current facility-administered medications for this visit.       ED  Discharged  11/8/2023 (5 hours)  [facility] Emergency Dept     James Luna MD  Last attending   Treatment team Epigastric pain +1 more  Clinical impression Abdominal Pain  Chief complaint     ED Provider Notes  James Luna MD (Physician)   Emergency Medicine  Expand All Collapse All        History      Chief Complaint:  Abdominal Pain        HPI   Carri Estrada is a 48 year old female who is otherwise healthy and presents with nausea and diarrhea for the past 5 days and upper abdominal pain for the past 3 days.  The pain is constant but worsens after eating.  She also spiked a low-grade fever in the past couple of days.  She is not taking thing for the pain.  She has never had pain like this in the past.  She denies any urinary symptoms.  She denies any abdominal surgeries in the past.        Independent Historian:    None-patient only     Review of External Notes:  Reviewed nurse triage note from this morning with recommendation go to the ER.        Past  "Medical History:         Past Medical History:   Diagnosis Date     Depressive disorder       Uncomplicated asthma when i was young               Past Surgical History:    Past Surgical History         Past Surgical History:   Procedure Laterality Date     VAGINA RECONSTRUCTION SURGERY                   Physical Exam   Patient Vitals for the past 24 hrs:    BP Temp Temp src Pulse Resp SpO2 Height Weight   11/08/23 1650 130/80 -- -- 98 19 98 % -- --   11/08/23 1135 132/83 99  F (37.2  C) Temporal 104 18 98 % 1.651 m (5' 5\") 108 kg (238 lb 1.6 oz)         Physical Exam  Vitals and nursing note reviewed.   Constitutional:       General: She is not in acute distress.     Appearance: She is not ill-appearing or toxic-appearing.   HENT:      Head: Normocephalic and atraumatic.      Right Ear: External ear normal.      Left Ear: External ear normal.      Nose: Nose normal.   Eyes:      Extraocular Movements: Extraocular movements intact.      Conjunctiva/sclera: Conjunctivae normal.   Cardiovascular:      Rate and Rhythm: Normal rate and regular rhythm.      Heart sounds: No murmur heard.  Pulmonary:      Effort: Pulmonary effort is normal. No respiratory distress.      Breath sounds: Normal breath sounds. No wheezing, rhonchi or rales.   Abdominal:      General: Abdomen is flat. Bowel sounds are normal. There is no distension.      Palpations: Abdomen is soft.      Tenderness: There is abdominal tenderness in the epigastric area. There is guarding. There is no rebound.   Musculoskeletal:         General: No deformity or signs of injury.      Cervical back: Normal range of motion and neck supple.   Skin:     General: Skin is warm and dry.      Findings: No rash.   Neurological:      Mental Status: She is alert and oriented to person, place, and time.   Psychiatric:         Behavior: Behavior normal.               Emergency Department Course      Imaging:  CT Abdomen Pelvis w Contrast   Final Result   IMPRESSION:    1.  " Possible changes of mild acute interstitial pancreatitis,   recommend correlation with lipase.   2.  No additional visualized explanation for patient's symptoms.       DAMIAN JAY MD            SYSTEM ID:  [id number]       US Abdomen Limited (RUQ)   Final Result   IMPRESSION:   1.  Normal gallbladder. No biliary ductal dilatation.   2.  Ill-defined echogenicity in the expected areas of the pancreas is   indeterminate. Consider a follow-up CT of the abdomen and pelvis for   further evaluation.       ДМИТРИЙ BALL MD            SYSTEM ID:  [id number]          Report per radiology     Laboratory:        Labs Ordered and Resulted from Time of ED Arrival to Time of ED Departure   ROUTINE UA WITH MICROSCOPIC REFLEX TO CULTURE - Abnormal       Result Value      Color Urine Light Yellow        Appearance Urine Clear        Glucose Urine Negative        Bilirubin Urine Negative        Ketones Urine Negative        Specific Gravity Urine 1.013        Blood Urine Trace (*)       pH Urine 6.5        Protein Albumin Urine Negative        Urobilinogen Urine Normal        Nitrite Urine Negative        Leukocyte Esterase Urine Negative        RBC Urine 2        WBC Urine <1        Squamous Epithelials Urine 2 (*)     CBC WITH PLATELETS AND DIFFERENTIAL - Abnormal     WBC Count 13.1 (*)       RBC Count 4.49        Hemoglobin 14.0        Hematocrit 41.9        MCV 93        MCH 31.2        MCHC 33.4        RDW 12.3        Platelet Count 238        % Neutrophils 74        % Lymphocytes 11        % Monocytes 7        % Eosinophils 7        % Basophils 0        % Immature Granulocytes 1        NRBCs per 100 WBC 0        Absolute Neutrophils 9.8 (*)       Absolute Lymphocytes 1.4        Absolute Monocytes 0.9        Absolute Eosinophils 0.9 (*)       Absolute Basophils 0.0        Absolute Immature Granulocytes 0.1        Absolute NRBCs 0.0      COMPREHENSIVE METABOLIC PANEL - Normal     Sodium 136        Potassium 4.0        Carbon  Dioxide (CO2) 25        Anion Gap 9        Urea Nitrogen 10.0        Creatinine 0.69        GFR Estimate >90        Calcium 9.4        Chloride 102        Glucose 84        Alkaline Phosphatase 68        AST 16        ALT 10        Protein Total 7.5        Albumin 4.3        Bilirubin Total 0.3      LIPASE - Normal     Lipase 59      HCG QUALITATIVE PREGNANCY - Normal     hCG Serum Qualitative Negative               Emergency Department Course & Assessments:              Interventions:  Medications   iopamidol (ISOVUE-370) solution 500 mL (100 mLs Intravenous $Given 23 1545)   CT scan flush (65 mLs Intravenous $Given 23 1545)         Independent Interpretation (X-rays, CTs, rhythm strip):  None     Consultations/Discussion of Management or Tests:  None     Social Determinants of Health affecting care:  None     Disposition:  The patient was discharged to home.      Impression & Plan    CMS Diagnoses: None    Medical Decision Makin-year-old female with upper abdominal pain, nausea, diarrhea.  She is quite tender in the epigastrium.  I suspect she may have cholecystitis.  Other possibilities include choledocholithiasis, hepatitis, pancreatitis, gastritis.  We will check labs and start with ultrasound.  Patient declines any pain medication.     1640    rechecked patient updated on results.  Her labs are significant for a mild leukocytosis but otherwise unremarkable.  Her ultrasound was negative for cholecystitis or choledocholithiasis.  Given the white count and her pain, a CT was then performed to rule out other etiologies.  There was some suggestion of possible mild interstitial pancreatitis, however lipase is normal.  It is possible that she could have very mild pancreatitis or gastritis or peptic ulcer.  I updated the patient on results and she continues to decline any pain medications.  She is currently on famotidine at baseline but I recommend she try a PPI and use Tums or Maalox as needed.  I  will prescribe Zofran as well for nausea.  Recommend avoiding alcohol and minimizing NSAIDs for the immediate future.  We discussed return precautions and I recommended close follow-up with her primary care physician given that the diagnosis at this point is still somewhat unclear.                               Again, thank you for allowing me to participate in the care of your patient.        Sincerely,        Oren Martin MD    Electronically signed

## 2025-06-25 NOTE — NURSING NOTE
Current patient location: [address] [place] [address] [address]   [place] [address]    Is the patient currently in the state of [place]? YES    Visit mode: VIDEO    If the visit is dropped, the patient can be reconnected by:VIDEO VISIT: Text to cell phone:   Telephone Information:   Mobile [phone]       Will anyone else be joining the visit? NO  (If patient encounters technical issues they should call [phone]    Are changes needed to the allergy or medication list? No    Are refills needed on medications prescribed by this physician? NO    Rooming Documentation:  Not applicable    Reason for visit: Consult    Fuozia ALVARES

## 2025-06-30 ENCOUNTER — MYC MEDICAL ADVICE (OUTPATIENT)
Dept: NEUROLOGY | Facility: CLINIC | Age: 50
End: 2025-06-30

## 2025-06-30 DIAGNOSIS — G43.709 CHRONIC MIGRAINE WITHOUT AURA WITHOUT STATUS MIGRAINOSUS, NOT INTRACTABLE: Primary | ICD-10-CM

## 2025-07-01 NOTE — TELEPHONE ENCOUNTER
Per LOV note:  - She does not want anything that is sedating so would favor nortriptyline or protriptyline, not keen on trying topiramate.     Please advise on alternative to propranolol.     NOV 9/2/25.    MATTHEW Jurado, BSN  [facility] Neurology

## 2025-07-02 ENCOUNTER — PATIENT OUTREACH (OUTPATIENT)
Dept: CARE COORDINATION | Facility: CLINIC | Age: 50
End: 2025-07-02
Payer: COMMERCIAL

## 2025-07-03 ENCOUNTER — TELEPHONE (OUTPATIENT)
Dept: NEUROLOGY | Facility: CLINIC | Age: 50
End: 2025-07-03
Payer: COMMERCIAL

## 2025-07-03 NOTE — TELEPHONE ENCOUNTER
Prior Authorization Approval    Medication: AJOVY 225 MG/1.5ML SC SOAJ  Authorization Effective Date: 6/3/2025  Authorization Expiration Date: 1/3/2026  Approved Dose/Quantity: NA  Reference #:     Insurance Company: [employer] [place] - Phone [phone] Fax [phone]  Expected CoPay: $    CoPay Card Available:      Financial Assistance Needed: NA

## 2025-07-03 NOTE — TELEPHONE ENCOUNTER
Prior Authorization Retail Medication Request    Medication/Dose: Fremanezumab-vfrm (AJOVY) 225 MG/1.5ML SOAJ  Diagnosis and ICD code (if different than what is on RX):    New/renewal/insurance change PA/secondary ins. PA:  Previously Tried and Failed:    Rationale:      Insurance   Primary:   Insurance ID:      Secondary (if applicable):  Insurance ID:      Pharmacy Information (if different than what is on RX)  Name:    Phone:    Fax:    Clinic Information  Preferred routing pool for dept communication: [facility] NEUROLOGY  POOL     Primary Visit Coverage    Payer Plan Sponsor Code Group Number Group Name   [facility] [id number]      Primary Visit Coverage Subscriber    ID Name [facility] Address   [id number] MICK JUNG [id number] [place] [address]

## 2025-07-05 ENCOUNTER — PATIENT OUTREACH (OUTPATIENT)
Dept: CARE COORDINATION | Facility: CLINIC | Age: 50
End: 2025-07-05
Payer: COMMERCIAL

## 2025-08-30 ASSESSMENT — HEADACHE IMPACT TEST (HIT 6)
HOW OFTEN HAVE YOU FELT FED UP OR IRRITATED BECAUSE OF YOUR HEADACHES: SOMETIMES
HOW OFTEN DID HEADACHS LIMIT CONCENTRATION ON WORK OR DAILY ACTIVITY: SOMETIMES
WHEN YOU HAVE A HEADACHE HOW OFTEN DO YOU WISH YOU COULD LIE DOWN: ALWAYS
WHEN YOU HAVE HEADACHES HOW OFTEN IS THE PAIN SEVERE: SOMETIMES
HOW OFTEN HAVE YOU FELT TOO TIRED TO WORK BECAUSE OF YOUR HEADACHES: SOMETIMES
HOW OFTEN DO HEADACHES LIMIT YOUR DAILY ACTIVITIES: SOMETIMES
HIT6 TOTAL SCORE: 63

## 2025-08-30 ASSESSMENT — MIGRAINE DISABILITY ASSESSMENT (MIDAS)
HOW MANY DAYS DID YOU MISS WORK OR SCHOOL BECAUSE OF HEADACHES: 4
HOW MANY DAYS IN THE PAST 3 MONTHS HAVE YOU HAD A HEADACHE: 60
HOW OFTEN WERE SOCIAL ACTIVITIES MISSED DUE TO HEADACHES: 2
ON A SCALE FROM 0-10 ON AVERAGE HOW PAINFUL WERE HEADACHES: 4
HOW MANY DAYS WAS HOUSEWORK PRODUCTIVITY CUT IN HALF DUE TO HEADACHES: 15
HOW MANY DAYS DID YOU NOT DO HOUSEWORK BECAUSE OF HEADACHES: 15
HOW MANY DAYS WAS YOUR PRODUCTIVITY CUT IN HALF BECAUSE OF HEADACHES: 30
TOTAL SCORE: 66

## 2025-09-02 ENCOUNTER — VIRTUAL VISIT (OUTPATIENT)
Dept: NEUROLOGY | Facility: CLINIC | Age: 50
End: 2025-09-02
Payer: COMMERCIAL

## 2025-09-02 DIAGNOSIS — G44.40 ANALGESIC OVERUSE HEADACHE: ICD-10-CM

## 2025-09-02 DIAGNOSIS — G43.709 CHRONIC MIGRAINE WITHOUT AURA WITHOUT STATUS MIGRAINOSUS, NOT INTRACTABLE: Primary | ICD-10-CM

## 2025-09-02 DIAGNOSIS — T39.95XA ANALGESIC OVERUSE HEADACHE: ICD-10-CM

## 2025-09-02 PROCEDURE — 98005 SYNCH AUDIO-VIDEO EST LOW 20: CPT

## 2025-09-02 PROCEDURE — 1126F AMNT PAIN NOTED NONE PRSNT: CPT | Mod: 95

## 2025-09-02 PROCEDURE — G2211 COMPLEX E/M VISIT ADD ON: HCPCS | Mod: 95

## (undated) RX ORDER — SIMETHICONE 40MG/0.6ML
SUSPENSION, DROPS(FINAL DOSAGE FORM)(ML) ORAL
Status: DISPENSED
Start: 2024-11-11

## (undated) RX ORDER — FENTANYL CITRATE 50 UG/ML
INJECTION, SOLUTION INTRAMUSCULAR; INTRAVENOUS
Status: DISPENSED
Start: 2024-11-11

## (undated) RX ORDER — ONDANSETRON 2 MG/ML
INJECTION INTRAMUSCULAR; INTRAVENOUS
Status: DISPENSED
Start: 2024-11-11